# Patient Record
Sex: FEMALE | Race: BLACK OR AFRICAN AMERICAN | NOT HISPANIC OR LATINO | Employment: FULL TIME | RURAL
[De-identification: names, ages, dates, MRNs, and addresses within clinical notes are randomized per-mention and may not be internally consistent; named-entity substitution may affect disease eponyms.]

---

## 2017-12-17 ENCOUNTER — OFFICE VISIT (OUTPATIENT)
Dept: RETAIL CLINIC | Facility: CLINIC | Age: 22
End: 2017-12-17

## 2017-12-17 VITALS
BODY MASS INDEX: 35.79 KG/M2 | WEIGHT: 228 LBS | OXYGEN SATURATION: 99 % | RESPIRATION RATE: 20 BRPM | TEMPERATURE: 97.9 F | HEIGHT: 67 IN | HEART RATE: 89 BPM

## 2017-12-17 DIAGNOSIS — J01.41 ACUTE RECURRENT PANSINUSITIS: Primary | ICD-10-CM

## 2017-12-17 PROCEDURE — 99203 OFFICE O/P NEW LOW 30 MIN: CPT | Performed by: NURSE PRACTITIONER

## 2017-12-17 RX ORDER — FLUOXETINE 10 MG/1
10 CAPSULE ORAL DAILY
COMMUNITY
End: 2022-12-12 | Stop reason: SDUPTHER

## 2017-12-17 RX ORDER — PREDNISONE 10 MG/1
TABLET ORAL DAILY
Qty: 21 EACH | Refills: 0 | Status: SHIPPED | OUTPATIENT
Start: 2017-12-17 | End: 2017-12-23

## 2017-12-17 RX ORDER — AMOXICILLIN AND CLAVULANATE POTASSIUM 875; 125 MG/1; MG/1
1 TABLET, FILM COATED ORAL 2 TIMES DAILY
Qty: 20 TABLET | Refills: 0 | Status: SHIPPED | OUTPATIENT
Start: 2017-12-17 | End: 2019-08-03

## 2017-12-17 RX ORDER — PSEUDOEPHEDRINE HCL 120 MG/1
120 TABLET, FILM COATED, EXTENDED RELEASE ORAL EVERY 12 HOURS
Qty: 20 TABLET | Refills: 0 | Status: SHIPPED | OUTPATIENT
Start: 2017-12-17 | End: 2017-12-27

## 2017-12-17 NOTE — PROGRESS NOTES
"Nurys Craig is a 22 y.o. female.     Sinus Problem   This is a new problem. The current episode started in the past 7 days. The problem has been gradually worsening since onset. There has been no fever. The pain is moderate. Associated symptoms include congestion, coughing (mild), headaches, a hoarse voice, sinus pressure and swollen glands. Pertinent negatives include no chills, ear pain, neck pain, shortness of breath, sneezing or sore throat. Past treatments include nothing. The treatment provided no relief.        The following portions of the patient's history were reviewed and updated as appropriate: allergies, current medications, past medical history, past social history, past surgical history and problem list.    Review of Systems   Constitutional: Negative for appetite change, chills and fever.   HENT: Positive for congestion, hoarse voice, postnasal drip, rhinorrhea, sinus pain and sinus pressure. Negative for ear pain, sneezing, sore throat and trouble swallowing.    Eyes: Negative.    Respiratory: Positive for cough (mild). Negative for chest tightness, shortness of breath and wheezing.    Cardiovascular: Negative.    Gastrointestinal: Negative for abdominal pain, diarrhea, nausea and vomiting.   Musculoskeletal: Negative.  Negative for neck pain.   Skin: Negative.    Neurological: Positive for headaches. Negative for dizziness.   Hematological: Positive for adenopathy.        Pulse 89  Temp 97.9 °F (36.6 °C)  Resp 20  Ht 170.2 cm (67\")  Wt 103 kg (228 lb)  LMP 12/03/2017  SpO2 99%  BMI 35.71 kg/m2     Objective   Physical Exam   Constitutional: She is oriented to person, place, and time. Vital signs are normal. She appears well-developed and well-nourished.   HENT:   Head: Normocephalic.   Right Ear: External ear and ear canal normal. No drainage, swelling or tenderness. Tympanic membrane is bulging. Tympanic membrane is not erythematous.   Left Ear: External ear and ear canal " normal. No drainage, swelling or tenderness. Tympanic membrane is bulging. Tympanic membrane is not erythematous.   Nose: Mucosal edema and rhinorrhea present. Right sinus exhibits maxillary sinus tenderness (severe) and frontal sinus tenderness. Left sinus exhibits maxillary sinus tenderness (severe) and frontal sinus tenderness.   Mouth/Throat: Uvula is midline, oropharynx is clear and moist and mucous membranes are normal. Tonsils are 0 on the right. Tonsils are 0 on the left. No tonsillar exudate.   Eyes: Conjunctivae are normal. Pupils are equal, round, and reactive to light.   Cardiovascular: Normal rate, regular rhythm, S1 normal, S2 normal and normal heart sounds.    Pulmonary/Chest: Effort normal and breath sounds normal. No respiratory distress. She has no wheezes.   Lymphadenopathy:        Head (right side): No tonsillar adenopathy present.        Head (left side): No tonsillar adenopathy present.     She has no cervical adenopathy.   Neurological: She is alert and oriented to person, place, and time.   Skin: Skin is warm, dry and intact. No rash noted.   Psychiatric: She has a normal mood and affect. Her speech is normal and behavior is normal. Thought content normal.   Vitals reviewed.      Assessment/Plan   Rosa was seen today for sinus problem.    Diagnoses and all orders for this visit:    Acute recurrent pansinusitis  -     PredniSONE (DELTASONE) 10 MG (21) tablet pack; Take  by mouth Daily for 6 days. Use as directed on package  -     amoxicillin-clavulanate (AUGMENTIN) 875-125 MG per tablet; Take 1 tablet by mouth 2 (Two) Times a Day.  -     pseudoephedrine (SUDAFED) 120 MG 12 hr tablet; Take 1 tablet by mouth Every 12 (Twelve) Hours for 10 days.

## 2019-08-03 ENCOUNTER — OFFICE VISIT (OUTPATIENT)
Dept: RETAIL CLINIC | Facility: CLINIC | Age: 24
End: 2019-08-03

## 2019-08-03 VITALS
HEART RATE: 87 BPM | BODY MASS INDEX: 42.13 KG/M2 | TEMPERATURE: 98 F | OXYGEN SATURATION: 99 % | DIASTOLIC BLOOD PRESSURE: 86 MMHG | WEIGHT: 269 LBS | RESPIRATION RATE: 15 BRPM | SYSTOLIC BLOOD PRESSURE: 122 MMHG

## 2019-08-03 DIAGNOSIS — J01.00 ACUTE NON-RECURRENT MAXILLARY SINUSITIS: Primary | ICD-10-CM

## 2019-08-03 PROCEDURE — 99213 OFFICE O/P EST LOW 20 MIN: CPT | Performed by: NURSE PRACTITIONER

## 2019-08-03 RX ORDER — FLUTICASONE PROPIONATE 50 MCG
2 SPRAY, SUSPENSION (ML) NASAL DAILY
COMMUNITY

## 2019-08-03 RX ORDER — AMOXICILLIN AND CLAVULANATE POTASSIUM 875; 125 MG/1; MG/1
1 TABLET, FILM COATED ORAL 2 TIMES DAILY
Qty: 14 TABLET | Refills: 0 | Status: SHIPPED | OUTPATIENT
Start: 2019-08-03 | End: 2019-08-10

## 2019-08-03 RX ORDER — BROMPHENIRAMINE MALEATE, PSEUDOEPHEDRINE HYDROCHLORIDE, AND DEXTROMETHORPHAN HYDROBROMIDE 2; 30; 10 MG/5ML; MG/5ML; MG/5ML
5 SYRUP ORAL 4 TIMES DAILY PRN
Qty: 120 ML | Refills: 0 | Status: SHIPPED | OUTPATIENT
Start: 2019-08-03 | End: 2019-08-08

## 2019-08-03 NOTE — PATIENT INSTRUCTIONS

## 2019-08-03 NOTE — PROGRESS NOTES
Subjective   Rosa Craig is a 24 y.o. female.   /86   Pulse 87   Temp 98 °F (36.7 °C)   Resp 15   Wt 122 kg (269 lb)   LMP  (Within Months)   SpO2 99%   BMI 42.13 kg/m²   Past Medical History:   Diagnosis Date   • Allergic    • Anxiety    • Asthma    • Depression      Allergies   Allergen Reactions   • Sulfur Rash       Sinusitis   This is a new problem. The current episode started in the past 7 days. The problem has been gradually worsening since onset. There has been no fever. The pain is moderate. Associated symptoms include congestion, coughing, headaches, a hoarse voice and sinus pressure. Pertinent negatives include no chills, diaphoresis, ear pain, neck pain, shortness of breath, sneezing, sore throat or swollen glands.        The following portions of the patient's history were reviewed and updated as appropriate: allergies, current medications, past family history, past medical history, past social history, past surgical history and problem list.    Review of Systems   Constitutional: Negative for chills and diaphoresis.   HENT: Positive for congestion, hoarse voice and sinus pressure. Negative for ear pain, sneezing and sore throat.    Respiratory: Positive for cough. Negative for shortness of breath.    Musculoskeletal: Negative for neck pain.   Neurological: Positive for headaches.       Objective   Physical Exam   Constitutional: She appears well-developed and well-nourished.  Non-toxic appearance. She appears ill (mild).   HENT:   Head: Normocephalic and atraumatic.   Right Ear: Tympanic membrane and ear canal normal.   Left Ear: Tympanic membrane and ear canal normal.   Nose: Right sinus exhibits maxillary sinus tenderness. Right sinus exhibits no frontal sinus tenderness. Left sinus exhibits maxillary sinus tenderness. Left sinus exhibits no frontal sinus tenderness.   Mouth/Throat: Uvula is midline, oropharynx is clear and moist and mucous membranes are normal.   Cardiovascular:  Regular rhythm and normal heart sounds.   Pulmonary/Chest: Effort normal. She has no wheezes. She has no rhonchi. She has no rales.   Lymphadenopathy:     She has no cervical adenopathy.   Skin: Skin is warm and dry.       Assessment/Plan   Rosa was seen today for sinusitis.    Diagnoses and all orders for this visit:    Acute non-recurrent maxillary sinusitis    Other orders  -     amoxicillin-clavulanate (AUGMENTIN) 875-125 MG per tablet; Take 1 tablet by mouth 2 (Two) Times a Day for 7 days.  -     brompheniramine-pseudoephedrine-DM 30-2-10 MG/5ML syrup; Take 5 mL by mouth 4 (Four) Times a Day As Needed for Congestion or Cough for up to 5 days.

## 2020-02-21 ENCOUNTER — LAB REQUISITION (OUTPATIENT)
Dept: LAB | Facility: HOSPITAL | Age: 25
End: 2020-02-21

## 2020-02-21 DIAGNOSIS — Z00.00 ROUTINE GENERAL MEDICAL EXAMINATION AT A HEALTH CARE FACILITY: ICD-10-CM

## 2020-02-21 PROCEDURE — 36415 COLL VENOUS BLD VENIPUNCTURE: CPT | Performed by: NURSE PRACTITIONER

## 2021-06-02 RX ORDER — NORETHINDRONE ACETATE AND ETHINYL ESTRADIOL AND FERROUS FUMARATE 1MG-20(21)
KIT ORAL
Qty: 28 TABLET | Refills: 3 | Status: SHIPPED | OUTPATIENT
Start: 2021-06-02 | End: 2022-12-12 | Stop reason: SDUPTHER

## 2022-06-06 ENCOUNTER — DOCUMENTATION (OUTPATIENT)
Dept: OBSTETRICS AND GYNECOLOGY | Facility: CLINIC | Age: 27
End: 2022-06-06

## 2022-06-06 RX ORDER — NORETHINDRONE ACETATE AND ETHINYL ESTRADIOL AND FERROUS FUMARATE 1MG-20(21)
KIT ORAL
Qty: 28 TABLET | Refills: 3 | OUTPATIENT
Start: 2022-06-06

## 2022-12-08 PROBLEM — E55.9 VITAMIN D DEFICIENCY: Status: ACTIVE | Noted: 2022-12-08

## 2022-12-08 PROBLEM — J45.909 ASTHMA: Status: ACTIVE | Noted: 2022-12-08

## 2022-12-08 PROBLEM — R73.03 PREDIABETES: Status: ACTIVE | Noted: 2022-12-08

## 2022-12-08 PROBLEM — J30.1 ALLERGIC RHINITIS DUE TO POLLEN: Status: ACTIVE | Noted: 2022-12-08

## 2022-12-08 PROBLEM — E66.9 OBESITY: Status: ACTIVE | Noted: 2022-12-08

## 2022-12-08 PROBLEM — E78.2 MIXED HYPERLIPIDEMIA: Status: ACTIVE | Noted: 2022-12-08

## 2022-12-08 PROBLEM — F32.A DEPRESSION: Status: ACTIVE | Noted: 2022-12-08

## 2022-12-08 PROBLEM — F41.9 ANXIETY: Status: ACTIVE | Noted: 2022-12-08

## 2022-12-08 PROBLEM — E28.2 POLYCYSTIC OVARIAN SYNDROME: Status: ACTIVE | Noted: 2022-12-08

## 2022-12-09 ENCOUNTER — OFFICE VISIT (OUTPATIENT)
Dept: FAMILY MEDICINE CLINIC | Facility: CLINIC | Age: 27
End: 2022-12-09

## 2022-12-09 VITALS
OXYGEN SATURATION: 99 % | SYSTOLIC BLOOD PRESSURE: 124 MMHG | HEIGHT: 67 IN | TEMPERATURE: 97.3 F | BODY MASS INDEX: 43.41 KG/M2 | DIASTOLIC BLOOD PRESSURE: 84 MMHG | WEIGHT: 276.6 LBS | HEART RATE: 81 BPM

## 2022-12-09 DIAGNOSIS — I10 BENIGN HYPERTENSION: ICD-10-CM

## 2022-12-09 DIAGNOSIS — E66.01 CLASS 3 SEVERE OBESITY DUE TO EXCESS CALORIES WITHOUT SERIOUS COMORBIDITY WITH BODY MASS INDEX (BMI) OF 40.0 TO 44.9 IN ADULT: ICD-10-CM

## 2022-12-09 DIAGNOSIS — Z23 NEED FOR HPV VACCINATION: ICD-10-CM

## 2022-12-09 DIAGNOSIS — F41.9 ANXIETY: ICD-10-CM

## 2022-12-09 DIAGNOSIS — E78.2 MIXED HYPERLIPIDEMIA: ICD-10-CM

## 2022-12-09 DIAGNOSIS — E55.9 VITAMIN D DEFICIENCY DISEASE: ICD-10-CM

## 2022-12-09 DIAGNOSIS — Z23 NEED FOR TDAP VACCINATION: ICD-10-CM

## 2022-12-09 DIAGNOSIS — R73.03 PREDIABETES: ICD-10-CM

## 2022-12-09 DIAGNOSIS — Z11.59 NEED FOR HEPATITIS C SCREENING TEST: ICD-10-CM

## 2022-12-09 DIAGNOSIS — Z11.4 SCREENING FOR HIV (HUMAN IMMUNODEFICIENCY VIRUS): ICD-10-CM

## 2022-12-09 DIAGNOSIS — Z00.00 ROUTINE GENERAL MEDICAL EXAMINATION AT A HEALTH CARE FACILITY: Primary | ICD-10-CM

## 2022-12-09 DIAGNOSIS — H91.93 HEARING DEFICIT, BILATERAL: ICD-10-CM

## 2022-12-09 DIAGNOSIS — Z23 NEED FOR PROPHYLACTIC VACCINATION AND INOCULATION AGAINST INFLUENZA: ICD-10-CM

## 2022-12-09 DIAGNOSIS — F32.A DEPRESSION, UNSPECIFIED DEPRESSION TYPE: ICD-10-CM

## 2022-12-09 DIAGNOSIS — Z23 NEED FOR PROPHYLACTIC VACCINATION AGAINST STREPTOCOCCUS PNEUMONIAE (PNEUMOCOCCUS): ICD-10-CM

## 2022-12-09 PROCEDURE — 99395 PREV VISIT EST AGE 18-39: CPT | Performed by: INTERNAL MEDICINE

## 2022-12-09 PROCEDURE — 93000 ELECTROCARDIOGRAM COMPLETE: CPT | Performed by: INTERNAL MEDICINE

## 2022-12-09 PROCEDURE — 90686 IIV4 VACC NO PRSV 0.5 ML IM: CPT | Performed by: INTERNAL MEDICINE

## 2022-12-09 PROCEDURE — 90677 PCV20 VACCINE IM: CPT | Performed by: INTERNAL MEDICINE

## 2022-12-09 PROCEDURE — 2014F MENTAL STATUS ASSESS: CPT | Performed by: INTERNAL MEDICINE

## 2022-12-09 PROCEDURE — 90471 IMMUNIZATION ADMIN: CPT | Performed by: INTERNAL MEDICINE

## 2022-12-09 PROCEDURE — 90472 IMMUNIZATION ADMIN EACH ADD: CPT | Performed by: INTERNAL MEDICINE

## 2022-12-09 PROCEDURE — 3008F BODY MASS INDEX DOCD: CPT | Performed by: INTERNAL MEDICINE

## 2022-12-09 PROCEDURE — 90715 TDAP VACCINE 7 YRS/> IM: CPT | Performed by: INTERNAL MEDICINE

## 2022-12-09 PROCEDURE — 90651 9VHPV VACCINE 2/3 DOSE IM: CPT | Performed by: INTERNAL MEDICINE

## 2022-12-09 PROCEDURE — 36415 COLL VENOUS BLD VENIPUNCTURE: CPT | Performed by: INTERNAL MEDICINE

## 2022-12-09 RX ORDER — ATORVASTATIN CALCIUM 20 MG/1
20 TABLET, FILM COATED ORAL
COMMUNITY
Start: 2022-11-26 | End: 2022-12-12 | Stop reason: SDUPTHER

## 2022-12-09 RX ORDER — ERGOCALCIFEROL 1.25 MG/1
CAPSULE ORAL
COMMUNITY
Start: 2022-09-26 | End: 2022-12-12 | Stop reason: SDUPTHER

## 2022-12-09 RX ORDER — LORATADINE 10 MG/1
TABLET ORAL
COMMUNITY
Start: 2022-09-02 | End: 2022-12-12 | Stop reason: SDUPTHER

## 2022-12-09 RX ORDER — LISINOPRIL 5 MG/1
TABLET ORAL
COMMUNITY
Start: 2022-09-20 | End: 2022-12-12 | Stop reason: SDUPTHER

## 2022-12-09 NOTE — PROGRESS NOTES
Female Physical Note      Date: 2022   Patient Name: Rosa Craig  : 1995   MRN: [id number]     Chief Complaint:    Chief Complaint   Patient presents with   • Annual Exam       History of Present Illness: Rosa Craig is a 27 y.o. female who is here today for their annual health maintenance and physical.  Patient has no specific concerns or complaints at this time.  History of prediabetes rarely checking blood sugars but taking metformin, quite sedentary, diet fairly healthy, history of hypertension with systolics in the 120s diastolics not recall, no chest pains palpitations dyspnea or edema.  No neuropathic symptoms in her feet.  She does have significant obesity having gained 3 pounds since she was last seen here 6 months ago, admitting that she is sedentary in her life.  Plans to increase her activity level.  Also history of vitamin D deficiency on high-dose weekly vitamin D supplementation.  History of anxiety and depression currently on fluoxetine and bupropion doing well from the standpoint.  He does require some labs updated and also require some vaccine update.  She does have a history of PCOS having been seen by Dr. Olsen with a Pap smear in 2020, normal study, menses regular on oral contraceptives.  Not sexually active for a number of years now      Subjective      Review of Systems:   Review of Systems    Past Medical History, Social History, Family History and Care Team were all reviewed with patient and updated as appropriate.     Medications:     Current Outpatient Medications:   •  atorvastatin (LIPITOR) 20 MG tablet, Take 20 mg by mouth every night at bedtime., Disp: , Rfl:   •  Aurovela FE  1-20 MG-MCG per tablet, TAKE 1 TABLET BY MOUTH EVERY DAY, Disp: 28 tablet, Rfl: 3  •  BUSPIRONE HCL PO, Take  by mouth., Disp: , Rfl:   •  FLUoxetine (PROzac) 10 MG capsule, Take 10 mg by mouth Daily., Disp: , Rfl:   •  fluticasone (FLONASE) 50 MCG/ACT nasal spray, 2 sprays into the  nostril(s) as directed by provider Daily., Disp: , Rfl:   •  lisinopril (PRINIVIL,ZESTRIL) 5 MG tablet, TAKE 1 TABLET BY MOUTH AT BEDTIME FOR URINE PROTEIN, Disp: , Rfl:   •  loratadine (CLARITIN) 10 MG tablet, TAKE 1 TABLET BY MOUTH EVERY DAY FOR ALLERGIES, Disp: , Rfl:   •  metFORMIN (GLUCOPHAGE) 500 MG tablet, TAKE 1 TABLET BY MOUTH TWICE A DAY WITH MORNING AND EVENING MEALS, Disp: 180 tablet, Rfl: 1  •  vitamin D (ERGOCALCIFEROL) 1.25 MG (53783 UT) capsule capsule, TAKE 1 CAPSULE BY MOUTH EVERY WEEK FOR 13 WEEKS, Disp: , Rfl:     Allergies:   Allergies   Allergen Reactions   • Elemental Sulfur Rash   • Sulfa Antibiotics Rash       Immunizations:  Health Maintenance Summary          Ordered - HEPATITIS C SCREENING (Once) Ordered on 12/9/2022    No completion, postpone, or frequency change history exists for this topic.          Overdue - COVID-19 Vaccine (4 - Booster for Moderna series) Overdue since 6/7/2022 04/12/2022  Imm Admin: COVID-19 (MODERNA) 1st, 2nd, 3rd Dose Only    05/22/2021  Imm Admin: COVID-19 (MODERNA) 1st, 2nd, 3rd Dose Only    04/17/2021  Imm Admin: COVID-19 (MODERNA) 1st, 2nd, 3rd Dose Only          Overdue - ANNUAL PHYSICAL (Yearly) Overdue since 12/4/2022 12/03/2021  Done          PAP SMEAR (Every 3 Years) Next due on 2/1/2023 02/01/2020  Patient-Reported (Performed Externally) - Normal reported by patient, Dr. Olsen          LIPID PANEL (Yearly) Next due on 6/28/2023 06/28/2022  Done          TDAP/TD VACCINES (2 - Td or Tdap) Next due on 12/9/2032 12/09/2022  Imm Admin: Tdap          Pneumococcal Vaccine 0-64 (Series Information) Completed    12/09/2022  Imm Admin: Pneumococcal Conjugate 20-Valent (PCV20)          INFLUENZA VACCINE  Completed    12/09/2022  Imm Admin: FluLaval/Fluzone >6mos    12/03/2021  Imm Admin: Influenza, Unspecified          HPV VACCINES (Series Information) Completed    12/09/2022  Imm Admin: Hpv9    09/14/2007  Imm Admin: HPV Quadrivalent                  Orders Placed This Encounter   Procedures   • Pneumococcal Conjugate Vaccine 20-Valent All   • Tdap Vaccine Greater Than or Equal To 6yo IM   • HPV Vaccine   • FluLaval/Fluarix/Fluzone >6 Months        Colorectal Screening:   NA   Last Completed Colonoscopy     This patient has no relevant Health Maintenance data.        Pap:  2020, normal   Last Completed Pap Smear          PAP SMEAR (Every 3 Years) Next due on 2/1/2023 02/01/2020  Patient-Reported (Performed Externally) - Normal reported by patient, Dr. Olsen               Mammogram:  NA   Last Completed Mammogram     This patient has no relevant Health Maintenance data.           CT for Smoker (Age 50-80, 20 pk yr):   NA  Bone Density/DEXA (Age 65 or high risk): NA  Hep C (Age 18-79 once): pending  HIV (Age 15-65 once): No results found for: [id number]  A1c: No results found for: HGBA1C pending  Lipid panel:  No results found for: LIPIDEXCLUSI current 6/2022    The ASCVD Risk score (Beni COOK, et al., 2019) failed to calculate for the following reasons:    The 2019 ASCVD risk score is only valid for ages 40 to 79      Tobacco Use: Low Risk    • Smoking Tobacco Use: Never   • Smokeless Tobacco Use: Never   • Passive Exposure: Not on file       Social History     Substance and Sexual Activity   Alcohol Use Never        Social History     Substance and Sexual Activity   Drug Use Never        Diet/Physical activity: Sedentary, fair diet    Sexual Health: Not requiring contraception though she does take oral contraceptives for menstrual regulation, not attempting pregnancy   Menopause: N/A  Menstrual Cycles: Regular, last menstrual cycle: Within the last month    Depression: PHQ-2 Depression Screening  PHQ-9 Total Score: 0     Objective     Physical Exam:  Vital Signs:   Vitals:    12/09/22 0920   BP: 124/84   BP Location: Left arm   Patient Position: Sitting   Cuff Size: Adult   Pulse: 81   Temp: 97.3 °F (36.3 °C)   TempSrc: Temporal   SpO2: 99%   Weight:  "125 kg (276 lb 9.6 oz)   Height: 170.2 cm (67\")     Body mass index is 43.32 kg/m².     Physical Exam  Vitals and nursing note reviewed.   Constitutional:       Appearance: Normal appearance. She is obese.      Comments: Taller statured, morbidly obese with BMI 43.3, chronically hard of hearing with hearing aids   HENT:      Head: Normocephalic and atraumatic.      Right Ear: Tympanic membrane, ear canal and external ear normal.      Left Ear: Tympanic membrane, ear canal and external ear normal.      Ears:      Comments: Hearing impediment, bilateral hearing aids in place which were removed for the exam     Nose: Nose normal.      Mouth/Throat:      Mouth: Mucous membranes are moist.      Pharynx: Oropharynx is clear.   Eyes:      Extraocular Movements: Extraocular movements intact.      Conjunctiva/sclera: Conjunctivae normal.      Pupils: Pupils are equal, round, and reactive to light.   Neck:      Vascular: No carotid bruit.      Comments: No masses or adenopathy  Cardiovascular:      Rate and Rhythm: Normal rate and regular rhythm.      Pulses: Normal pulses.      Heart sounds: Normal heart sounds. No murmur heard.    No friction rub. No gallop.      Comments: 2+ carotids without bruits, 2+ radial pulses, 2+ femoral pulses without bruits, 2+ bipedal pulses with good perfusion and no edema  Pulmonary:      Effort: Pulmonary effort is normal.      Breath sounds: Normal breath sounds.      Comments: No cough wheeze or dyspnea  Chest:   Breasts:     Joseph Score is 5.      Breasts are symmetrical.      Right: Normal. No swelling, mass, nipple discharge, skin change or tenderness.      Left: Normal. No swelling, mass, nipple discharge, skin change or tenderness.   Abdominal:      Comments: Abdomen is obese soft and nontender with no organomegaly or masses and normal bowel sound   Genitourinary:     Comments: Pelvic exam deferred given Pap smear less than 3 years ago normal, with no complaints, rectal exam deferred " given lack of complaints and no indication for exam  Musculoskeletal:         General: No swelling, tenderness or deformity. Normal range of motion.      Cervical back: Normal range of motion and neck supple. No rigidity or tenderness.      Right lower leg: No edema.      Left lower leg: No edema.   Lymphadenopathy:      Cervical: No cervical adenopathy.      Upper Body:      Right upper body: No supraclavicular or axillary adenopathy.      Left upper body: No supraclavicular or axillary adenopathy.   Skin:     General: Skin is warm and dry.      Capillary Refill: Capillary refill takes less than 2 seconds.      Findings: No lesion or rash.   Neurological:      General: No focal deficit present.      Mental Status: She is alert and oriented to person, place, and time. Mental status is at baseline.      Cranial Nerves: No cranial nerve deficit.      Sensory: No sensory deficit.      Motor: No weakness.      Coordination: Coordination normal.      Comments: Bipedal exam with 2+ DP and 2+ PT pulses bilaterally, normal sensation in both feet to fine touch vibration and pinprick, motor exam normal, no edema, does have bilateral hearing deficit wearing hearing aids chronically   Psychiatric:         Mood and Affect: Mood normal.         Behavior: Behavior normal.         Thought Content: Thought content normal.         Judgment: Judgment normal.         POCT Results (if applicable);   No results found for this or any previous visit.       ECG 12 Lead    Date/Time: 12/9/2022 10:33 AM  Performed by: Danny Boss MD  Authorized by: Danny Boss MD   Comparison: compared with previous ECG from 12/3/2021  Similar to previous ECG  Comparison to previous ECG: Normal sinus rhythm rate 70, RR prime in V1, nonspecific T wave abnormality in V1 and V3 unchanged versus prior study, likely of no clinical significance              Assessment / Plan      Assessment/Plan:   Diagnoses and all orders for this visit:    1. Routine  general medical examination at a health care facility (Primary)    2. Prediabetes  -     Basic Metabolic Panel; Future  -     Hemoglobin A1c; Future  -     TSH Rfx On Abnormal To Free T4; Future  -     MicroAlbumin, Urine, Random - Urine, Clean Catch; Future  -     MicroAlbumin, Urine, Random - Urine, Clean Catch  -     TSH Rfx On Abnormal To Free T4  -     Hemoglobin A1c  -     Basic Metabolic Panel  No secondary sequelae most recent hemoglobin A1c 6.2% in 6/2022, on metformin 500 mg twice daily.  Emphasize healthy lifestyle diet and exercise, updating testing.  3. Benign hypertension  -     Urinalysis With Culture If Indicated -; Future  -     ECG 12 Lead  -     Urinalysis With Culture If Indicated - Urine, Clean Catch  Good control acutely and chronically take low-dose lisinopril 5 mg daily.  Continue monitoring  4. Mixed hyperlipidemia  On atorvastatin 20 mg nightly.  Update testing.  5. Anxiety  Overall controlled well taking fluoxetine 10 mg daily and buspirone.  Continue same.  6. Depression, unspecified depression type  See above.  7. Vitamin D deficiency disease  -     Vitamin D,25-Hydroxy; Future  -     Vitamin D,25-Hydroxy  On high-dose vitamin D 50,000 IU weekly.  Update level.  8. Class 3 severe obesity due to excess calories without serious comorbidity with body mass index (BMI) of 40.0 to 44.9 in adult (HCC)  -     TSH Rfx On Abnormal To Free T4; Future  -     TSH Rfx On Abnormal To Free T4  Progression of weight gain, significantly overweight.  Emphasized to patient need for strict lifestyle change including regular daily exercise of at least 30 minutes, and dietary modification in order to lose weight.  Discussed the health risks of her obesity  9. Hearing deficit, bilateral  Longstanding since birth, wearing bilateral hearing aids.  10. Screening for HIV (human immunodeficiency virus)  -     HIV-1 / O / 2 Ag / Antibody 4th Generation; Future  -     HIV-1 / O / 2 Ag / Antibody 4th Generation    11.  Need for hepatitis C screening test  -     Hepatitis C Antibody; Future  -     Hepatitis C Antibody    12. Need for HPV vaccination  -     HPV Vaccine    13. Need for prophylactic vaccination against Streptococcus pneumoniae (pneumococcus)  -     Pneumococcal Conjugate Vaccine 20-Valent All    14. Need for Tdap vaccination  -     Tdap Vaccine Greater Than or Equal To 6yo IM    15. Need for prophylactic vaccination and inoculation against influenza  -     Cancel: Fluzone Split Quad (Multi-dose)  -     FluLaval/Fluarix/Fluzone >6 Months    Last Pap smear spring 2020 with Dr. Olsen, not sexually active for a number of years now.  Consider updating Pap smear next year.    Healthcare Maintenance:  Counseling provided based on age appropriate USPSTF guidelines.  Class 3 Severe Obesity (BMI >=40). Obesity-related health conditions include the following: hypertension and Prediabetes. Obesity is worsening. BMI is is above average; BMI management plan is completed. We discussed low calorie, low carb based diet program, portion control and increasing exercise.    Rosa Kiara voices understanding and acceptance of this advice and will call back with any further questions or concerns. AVS with preventive healthcare tips printed for patient.     Vaccine Counseling:  “Discussed risks/benefits to vaccination, reviewed components of the vaccine, discussed VIS, discussed informed consent, informed consent obtained. Patient/Parent was allowed to accept or refuse vaccine. Questions answered to satisfactory state of patient/Parent. We reviewed typical age appropriate and seasonally appropriate vaccinations. Reviewed immunization history and updated state vaccination form as needed. Patient was counseled on HPV  Influenza  Prevnar 20  Tdap      Follow Up:   Return in about 6 months (around 6/9/2023) for Recheck, Labs today.      At [facility], we believe that sharing information builds trust and better relationships. You are  receiving this note because you recently visited [facility]. It is possible you will see health information before a provider has talked with you about it. This kind of information can be easy to misunderstand. To help you fully understand what it means for your health, we urge you to discuss this note with your provider.    Danny Boss MD  [facility]

## 2022-12-09 NOTE — PROGRESS NOTES
Venipuncture Blood Specimen Collection  Venipuncture performed in left hand by Neisha Laguna MA with good hemostasis. Patient tolerated the procedure well without complications.   12/09/22   Neisha Laguna MA

## 2022-12-10 LAB
25(OH)D3+25(OH)D2 SERPL-MCNC: 24 NG/ML (ref 30–100)
APPEARANCE UR: CLEAR
BACTERIA #/AREA URNS HPF: NORMAL /[HPF]
BILIRUB UR QL STRIP: NEGATIVE
BUN SERPL-MCNC: 5 MG/DL (ref 6–20)
BUN/CREAT SERPL: 8 (ref 9–23)
CALCIUM SERPL-MCNC: 9.2 MG/DL (ref 8.7–10.2)
CASTS URNS QL MICRO: NORMAL /LPF
CHLORIDE SERPL-SCNC: 100 MMOL/L (ref 96–106)
CO2 SERPL-SCNC: 21 MMOL/L (ref 20–29)
COLOR UR: YELLOW
CREAT SERPL-MCNC: 0.63 MG/DL (ref 0.57–1)
EGFRCR SERPLBLD CKD-EPI 2021: 125 ML/MIN/1.73
EPI CELLS #/AREA URNS HPF: NORMAL /HPF (ref 0–10)
GLUCOSE SERPL-MCNC: 86 MG/DL (ref 70–99)
GLUCOSE UR QL STRIP: NEGATIVE
HBA1C MFR BLD: 6 % (ref 4.8–5.6)
HCV AB S/CO SERPL IA: <0.1 S/CO RATIO (ref 0–0.9)
HGB UR QL STRIP: NEGATIVE
HIV 1+2 AB+HIV1 P24 AG SERPL QL IA: NON REACTIVE
KETONES UR QL STRIP: NEGATIVE
LEUKOCYTE ESTERASE UR QL STRIP: NEGATIVE
MICRO URNS: NORMAL
MICRO URNS: NORMAL
MICROALBUMIN UR-MCNC: 7.9 UG/ML
NITRITE UR QL STRIP: NEGATIVE
PH UR STRIP: 6.5 [PH] (ref 5–7.5)
POTASSIUM SERPL-SCNC: 3.9 MMOL/L (ref 3.5–5.2)
PROT UR QL STRIP: NEGATIVE
RBC #/AREA URNS HPF: NORMAL /HPF (ref 0–2)
SODIUM SERPL-SCNC: 139 MMOL/L (ref 134–144)
SP GR UR STRIP: 1.01 (ref 1–1.03)
TSH SERPL DL<=0.005 MIU/L-ACNC: 1.23 UIU/ML (ref 0.45–4.5)
URINALYSIS REFLEX: NORMAL
UROBILINOGEN UR STRIP-MCNC: 0.2 MG/DL (ref 0.2–1)
WBC #/AREA URNS HPF: NORMAL /HPF (ref 0–5)

## 2022-12-12 ENCOUNTER — TELEPHONE (OUTPATIENT)
Dept: FAMILY MEDICINE CLINIC | Facility: CLINIC | Age: 27
End: 2022-12-12

## 2022-12-12 RX ORDER — LISINOPRIL 5 MG/1
5 TABLET ORAL DAILY
Qty: 90 TABLET | Refills: 1 | Status: SHIPPED | OUTPATIENT
Start: 2022-12-12 | End: 2022-12-12 | Stop reason: SDUPTHER

## 2022-12-12 RX ORDER — LISINOPRIL 5 MG/1
5 TABLET ORAL DAILY
Qty: 90 TABLET | Refills: 1 | Status: SHIPPED | OUTPATIENT
Start: 2022-12-12

## 2022-12-12 RX ORDER — NORETHINDRONE ACETATE AND ETHINYL ESTRADIOL 1MG-20(21)
1 KIT ORAL DAILY
Qty: 28 TABLET | Refills: 3 | Status: SHIPPED | OUTPATIENT
Start: 2022-12-12

## 2022-12-12 RX ORDER — FLUOXETINE 10 MG/1
10 CAPSULE ORAL DAILY
Qty: 90 CAPSULE | Refills: 1 | Status: SHIPPED | OUTPATIENT
Start: 2022-12-12 | End: 2022-12-12 | Stop reason: SDUPTHER

## 2022-12-12 RX ORDER — FLUOXETINE 10 MG/1
10 CAPSULE ORAL DAILY
Qty: 90 CAPSULE | Refills: 1 | Status: SHIPPED | OUTPATIENT
Start: 2022-12-12

## 2022-12-12 RX ORDER — NORETHINDRONE ACETATE AND ETHINYL ESTRADIOL 1MG-20(21)
1 KIT ORAL DAILY
Qty: 28 TABLET | Refills: 3 | Status: SHIPPED | OUTPATIENT
Start: 2022-12-12 | End: 2022-12-12 | Stop reason: SDUPTHER

## 2022-12-12 RX ORDER — ERGOCALCIFEROL 1.25 MG/1
50000 CAPSULE ORAL
Qty: 5 CAPSULE | Refills: 3 | Status: SHIPPED | OUTPATIENT
Start: 2022-12-12

## 2022-12-12 RX ORDER — LORATADINE 10 MG/1
10 TABLET ORAL DAILY
Qty: 90 TABLET | Refills: 1 | Status: SHIPPED | OUTPATIENT
Start: 2022-12-12

## 2022-12-12 RX ORDER — ATORVASTATIN CALCIUM 20 MG/1
20 TABLET, FILM COATED ORAL
Qty: 90 TABLET | Refills: 1 | Status: SHIPPED | OUTPATIENT
Start: 2022-12-12

## 2022-12-12 RX ORDER — ATORVASTATIN CALCIUM 20 MG/1
20 TABLET, FILM COATED ORAL
Qty: 90 TABLET | Refills: 1 | Status: SHIPPED | OUTPATIENT
Start: 2022-12-12 | End: 2022-12-12 | Stop reason: SDUPTHER

## 2022-12-12 RX ORDER — LORATADINE 10 MG/1
10 TABLET ORAL DAILY
Qty: 90 TABLET | Refills: 1 | Status: SHIPPED | OUTPATIENT
Start: 2022-12-12 | End: 2022-12-12 | Stop reason: SDUPTHER

## 2022-12-12 NOTE — TELEPHONE ENCOUNTER
----- Message from Danny Boss MD sent at 12/11/2022  1:20 PM EST -----  Please advise patient that her recently obtained laboratory testing was satisfactory including thyroid testing, chemistry profile, cholesterol profile, and urinalysis along with her HIV and hepatitis C testing being negative.  Her hemoglobin A1c was 6.0% which was improved from her previous value of 6.2%, consistent with her known diagnosis of prediabetes, slightly improved, planning to repeat hemoglobin A1c in 6 months.  Emphasize healthy lifestyle with diet exercise and attempts at weight loss.  Her vitamin D level was slightly low at 24 with a normal range ideally of 30 or greater, patient recommended to continue her prescription high-dose vitamin D 50,000 IU taken once weekly, and would also add an additional vitamin D3 1000 IU daily over-the-counter, planning to repeat testing of all of her standard labs in another year.

## 2023-02-10 ENCOUNTER — OFFICE VISIT (OUTPATIENT)
Dept: FAMILY MEDICINE CLINIC | Facility: CLINIC | Age: 28
End: 2023-02-10
Payer: MEDICARE

## 2023-02-10 VITALS
OXYGEN SATURATION: 98 % | DIASTOLIC BLOOD PRESSURE: 82 MMHG | SYSTOLIC BLOOD PRESSURE: 122 MMHG | WEIGHT: 277.2 LBS | HEART RATE: 84 BPM | BODY MASS INDEX: 43.51 KG/M2 | TEMPERATURE: 98.1 F | HEIGHT: 67 IN

## 2023-02-10 DIAGNOSIS — B34.9 VIRAL SYNDROME: Primary | ICD-10-CM

## 2023-02-10 LAB
EXPIRATION DATE: NORMAL
FLUAV AG UPPER RESP QL IA.RAPID: NOT DETECTED
FLUBV AG UPPER RESP QL IA.RAPID: NOT DETECTED
INTERNAL CONTROL: NORMAL
Lab: NORMAL
SARS-COV-2 AG UPPER RESP QL IA.RAPID: NOT DETECTED

## 2023-02-10 PROCEDURE — 87428 SARSCOV & INF VIR A&B AG IA: CPT | Performed by: INTERNAL MEDICINE

## 2023-02-10 PROCEDURE — 99213 OFFICE O/P EST LOW 20 MIN: CPT | Performed by: INTERNAL MEDICINE

## 2023-02-10 RX ORDER — BROMPHENIRAMINE MALEATE, PSEUDOEPHEDRINE HYDROCHLORIDE, AND DEXTROMETHORPHAN HYDROBROMIDE 2; 30; 10 MG/5ML; MG/5ML; MG/5ML
SYRUP ORAL
Qty: 180 ML | Refills: 0 | Status: SHIPPED | OUTPATIENT
Start: 2023-02-10 | End: 2023-03-16

## 2023-02-10 NOTE — PROGRESS NOTES
Answers for HPI/ROS submitted by the patient on 2/10/2023  Please describe your symptoms.: Sinus pressure, sinus headaches,  thoat hurt when swallowing & joint aches expectly around the neck and shoulders  Have you had these symptoms before?: Yes  How long have you been having these symptoms?: 1-4 days  What is the primary reason for your visit?: Other        Follow Up Office Visit      Date: 02/10/2023   Patient Name: Rosa Craig  : 1995   MRN: [id number]     Chief Complaint:    Chief Complaint   Patient presents with   • Sinusitis     Pressure behind ear     • Generalized Body Aches   • Shortness of Breath       History of Present Illness: Rosa Craig is a 27 y.o. female who is here today for onset 4 days ago of some nasal congestion with some drainage, symptoms slightly improving, headache intermittently, scratchy sore throat, minimal cough primarily manifested by postnasal drainage without any deep chest congestion and no dyspnea, having some malaise with some myalgias across her neck and back, subjective fevers but no chills, 4 days ago having a couple episodes of diarrhea but this resolving with no abdominal pain nausea or vomiting, appetite slightly diminished.  Sent home from work today after she had stayed home from work for the previous couple days.  Have been vague illnesses in colleagues at her work..  Non-smoker.  Has not had COVID-19 infection in the last several months.    Subjective      Review of Systems:   Review of Systems    I have reviewed the patients family history, social history, past medical history, past surgical history and have updated it as appropriate.     Medications:     Current Outpatient Medications:   •  atorvastatin (LIPITOR) 20 MG tablet, Take 1 tablet by mouth every night at bedtime., Disp: 90 tablet, Rfl: 1  •  BUSPIRONE HCL PO, Take  by mouth., Disp: , Rfl:   •  FLUoxetine (PROzac) 10 MG capsule, Take 1 capsule by mouth Daily., Disp: 90 capsule, Rfl: 1  •   "fluticasone (FLONASE) 50 MCG/ACT nasal spray, 2 sprays into the nostril(s) as directed by provider Daily., Disp: , Rfl:   •  lisinopril (PRINIVIL,ZESTRIL) 5 MG tablet, Take 1 tablet by mouth Daily., Disp: 90 tablet, Rfl: 1  •  loratadine (CLARITIN) 10 MG tablet, Take 1 tablet by mouth Daily., Disp: 90 tablet, Rfl: 1  •  metFORMIN (GLUCOPHAGE) 500 MG tablet, Take 1 tablet by mouth 2 (Two) Times a Day With Meals., Disp: 180 tablet, Rfl: 1  •  norethindrone-ethinyl estradiol FE (Aurovela FE 1/20) 1-20 MG-MCG per tablet, Take 1 tablet by mouth Daily., Disp: 28 tablet, Rfl: 3  •  vitamin D (ERGOCALCIFEROL) 1.25 MG (44205 UT) capsule capsule, Take 1 capsule by mouth Every 7 (Seven) Days., Disp: 5 capsule, Rfl: 3  •  brompheniramine-pseudoephedrine-DM 30-2-10 MG/5ML syrup, 5 to 10 mL orally every 4-6 hours as needed for cold and cough symptoms, Disp: 180 mL, Rfl: 0    Allergies:   Allergies   Allergen Reactions   • Elemental Sulfur Rash   • Sulfa Antibiotics Rash       Objective     Physical Exam: Please see above  Vital Signs:   Vitals:    02/10/23 1150   BP: 122/82   Pulse: 84   Temp: 98.1 °F (36.7 °C)   TempSrc: Temporal   SpO2: 98%   Weight: 126 kg (277 lb 3.2 oz)   Height: 170.2 cm (67\")     Body mass index is 43.42 kg/m².       Physical Exam  General: Tall mildly ill nontoxic 27-year-old female in no acute distress.  Head and neck: TMs and canals bilaterally clear after removal of hearing aids, nares mild to moderate congestion with red turbinates and scant mucus, vague sinus discomfort to palpation diffusely across her frontal maxillary sinuses, oropharynx with mild posterior erythema, no exudate or petechiae, moist membranes, neck with some mild generalized discomfort to palpation diffusely with no focality, no adenopathy or masses, no meningeal signs  Lungs are clear with no wheeze tachypnea dyspnea or cough  Cardiac regular rate rhythm with no murmurs gallops or rubs  Abdomen obese soft and nontender with no " organomegaly or masses and normal bowel sounds  Neurological exam grossly normal other than chronic hearing deficit treated with hearing aids  Procedures    Results:   Labs:   Hemoglobin A1C   Date Value Ref Range Status   12/09/2022 6.0 (H) 4.8 - 5.6 % Final     Comment:              Prediabetes: 5.7 - 6.4           Diabetes: >6.4           Glycemic control for adults with diabetes: <7.0       TSH   Date Value Ref Range Status   12/09/2022 1.230 0.450 - 4.500 uIU/mL Final        POCT Results (if applicable):   Results for orders placed or performed in visit on 02/10/23   Covid-19 + Flu A&B AG, Veritor    Specimen: Swab   Result Value Ref Range    SARS Antigen Not Detected Not Detected, Presumptive Negative    Influenza A Antigen KAM Not Detected Not Detected    Influenza B Antigen KAM Not Detected Not Detected    Internal Control Passed Passed    Lot Number [id number]     Expiration Date 03/02/2023        Imaging:   No valid procedures specified.     Assessment / Plan      Assessment/Plan:   Diagnoses and all orders for this visit:    1. Viral syndrome (Primary)  -     Covid-19 + Flu A&B AG, Veritor  -     brompheniramine-pseudoephedrine-DM 30-2-10 MG/5ML syrup; 5 to 10 mL orally every 4-6 hours as needed for cold and cough symptoms  Dispense: 180 mL; Refill: 0  Rapid COVID-19 and rapid Influenza Type A and type B are all negative.  Treat symptomatically with cough and cold medication as prescribed along with fluids and Motrin or Tylenol.  Anticipate she will be well enough to return back to work for the upcoming week and this coming Monday.  Advise if not continuing to improve or for any significant regression in her symptoms.      Follow Up:   Return if symptoms worsen or fail to improve.      At [facility], we believe that sharing information builds trust and better relationships. You are receiving this note because you recently visited [facility]. It is possible you will see health information before a  provider has talked with you about it. This kind of information can be easy to misunderstand. To help you fully understand what it means for your health, we urge you to discuss this note with your provider.    Danny Boss MD  [facility]

## 2023-03-16 ENCOUNTER — OFFICE VISIT (OUTPATIENT)
Dept: FAMILY MEDICINE CLINIC | Facility: CLINIC | Age: 28
End: 2023-03-16
Payer: MEDICARE

## 2023-03-16 VITALS
TEMPERATURE: 98.6 F | SYSTOLIC BLOOD PRESSURE: 130 MMHG | BODY MASS INDEX: 43.85 KG/M2 | OXYGEN SATURATION: 98 % | HEIGHT: 67 IN | DIASTOLIC BLOOD PRESSURE: 86 MMHG | HEART RATE: 100 BPM | WEIGHT: 279.4 LBS

## 2023-03-16 DIAGNOSIS — B30.9 ACUTE VIRAL CONJUNCTIVITIS OF LEFT EYE: Primary | ICD-10-CM

## 2023-03-16 PROCEDURE — 99213 OFFICE O/P EST LOW 20 MIN: CPT | Performed by: INTERNAL MEDICINE

## 2023-03-16 PROCEDURE — 1159F MED LIST DOCD IN RCRD: CPT | Performed by: INTERNAL MEDICINE

## 2023-03-16 PROCEDURE — 1160F RVW MEDS BY RX/DR IN RCRD: CPT | Performed by: INTERNAL MEDICINE

## 2023-03-16 RX ORDER — OLOPATADINE HYDROCHLORIDE 1 MG/ML
1 SOLUTION/ DROPS OPHTHALMIC 2 TIMES DAILY
Qty: 5 ML | Refills: 0 | Status: SHIPPED | OUTPATIENT
Start: 2023-03-16

## 2023-03-16 NOTE — PROGRESS NOTES
Follow Up Office Visit      Date: 2023   Patient Name: Rosa Craig  : 1995   MRN: [id number]     Chief Complaint:    Chief Complaint   Patient presents with   • Conjunctivitis       History of Present Illness: Rosa Craig is a 27 y.o. female who is here today for acute onset this morning of left redness of the eye with some discharge and matting, that is gotten better as the days progressed.  No involvement of her right eye, has had some nasal stuffiness and sneezing assume this might be related to a cat allergy or mold but again is only in the 1 eye.  Is taking her Flonase and loratadine.  Not aware of any ill contacts.    Subjective      Review of Systems:   Review of Systems    I have reviewed the patients family history, social history, past medical history, past surgical history and have updated it as appropriate.     Medications:     Current Outpatient Medications:   •  atorvastatin (LIPITOR) 20 MG tablet, Take 1 tablet by mouth every night at bedtime., Disp: 90 tablet, Rfl: 1  •  BUSPIRONE HCL PO, Take  by mouth., Disp: , Rfl:   •  FLUoxetine (PROzac) 10 MG capsule, Take 1 capsule by mouth Daily., Disp: 90 capsule, Rfl: 1  •  fluticasone (FLONASE) 50 MCG/ACT nasal spray, 2 sprays into the nostril(s) as directed by provider Daily., Disp: , Rfl:   •  lisinopril (PRINIVIL,ZESTRIL) 5 MG tablet, Take 1 tablet by mouth Daily., Disp: 90 tablet, Rfl: 1  •  loratadine (CLARITIN) 10 MG tablet, Take 1 tablet by mouth Daily., Disp: 90 tablet, Rfl: 1  •  metFORMIN (GLUCOPHAGE) 500 MG tablet, Take 1 tablet by mouth 2 (Two) Times a Day With Meals., Disp: 180 tablet, Rfl: 1  •  norethindrone-ethinyl estradiol FE (Aurovela FE ) 1-20 MG-MCG per tablet, Take 1 tablet by mouth Daily., Disp: 28 tablet, Rfl: 3  •  vitamin D (ERGOCALCIFEROL) 1.25 MG (95257 UT) capsule capsule, Take 1 capsule by mouth Every 7 (Seven) Days., Disp: 5 capsule, Rfl: 3  •  olopatadine (Patanol) 0.1 % ophthalmic solution,  "Administer 1 drop into the left eye 2 (Two) Times a Day. As needed for conjunctivitis, Disp: 5 mL, Rfl: 0    Allergies:   Allergies   Allergen Reactions   • Elemental Sulfur Rash   • Sulfa Antibiotics Rash       Objective     Physical Exam: Please see above  Vital Signs:   Vitals:    03/16/23 1609   BP: 130/86   BP Location: Left arm   Patient Position: Sitting   Cuff Size: Adult   Pulse: 100   Temp: 98.6 °F (37 °C)   TempSrc: Temporal   SpO2: 98%   Weight: 127 kg (279 lb 6.4 oz)   Height: 170.2 cm (67\")     Body mass index is 43.76 kg/m².       Physical Exam  General: Healthy appearing 27-year-old female in no acute distress.  Head and neck: Left ocular and palpebral conjunctive a moderately injected with no current discharge, no chemosis or proptosis, no lid edema or periorbital swelling, right eye clear, TMs and canals bilaterally clear, nares minimal congestion with no rhinorrhea, oral pharynx clear, neck supple with no masses or tenderness  Lungs are clear  Cardiac regular rate rhythm with no murmurs gallops or rubs  Procedures    Results:   Labs:   Hemoglobin A1C   Date Value Ref Range Status   12/09/2022 6.0 (H) 4.8 - 5.6 % Final     Comment:              Prediabetes: 5.7 - 6.4           Diabetes: >6.4           Glycemic control for adults with diabetes: <7.0       TSH   Date Value Ref Range Status   12/09/2022 1.230 0.450 - 4.500 uIU/mL Final        POCT Results (if applicable):   Results for orders placed or performed in visit on 02/10/23   Covid-19 + Flu A&B AG, Veritor    Specimen: Swab   Result Value Ref Range    SARS Antigen Not Detected Not Detected, Presumptive Negative    Influenza A Antigen KAM Not Detected Not Detected    Influenza B Antigen KAM Not Detected Not Detected    Internal Control Passed Passed    Lot Number [id number]     Expiration Date 03/02/2023        Imaging:   No valid procedures specified.       Assessment / Plan      Assessment/Plan:   Diagnoses and all orders for this " visit:    1. Acute viral conjunctivitis of left eye (Primary)  -     olopatadine (Patanol) 0.1 % ophthalmic solution; Administer 1 drop into the left eye 2 (Two) Times a Day. As needed for conjunctivitis  Dispense: 5 mL; Refill: 0  High likelihood that this represents an infectious conjunctivitis given unilateral presentation, rather than allergy, and in this presentation also much more likely viral than bacterial, with no purulent discharge or significant redness of the surrounding orbital structures.  Treat symptomatically with Patanol drops along with cool moist compresses.  Advised of contagiousness until resolves.  Advise if not resolving over several days or for any significant worsening or development of periorbital redness or swelling.      Follow Up:   Return if symptoms worsen or fail to improve.      At [facility], we believe that sharing information builds trust and better relationships. You are receiving this note because you recently visited [facility]. It is possible you will see health information before a provider has talked with you about it. This kind of information can be easy to misunderstand. To help you fully understand what it means for your health, we urge you to discuss this note with your provider.    Danny Boss MD  [facility]   Answers for HPI/ROS submitted by the patient on 3/16/2023  Please describe your symptoms.: Pink eye like, goopy crust aroud eye lid after waking up, is allergy  Have you had these symptoms before?: Yes  How long have you been having these symptoms?: 1-4 days  Please list any medications you are currently taking for this condition.: Lotaradine  Please describe any probable cause for these symptoms. : Cat hair & dry rotted molds  What is the primary reason for your visit?: Other

## 2023-06-09 ENCOUNTER — OFFICE VISIT (OUTPATIENT)
Dept: FAMILY MEDICINE CLINIC | Facility: CLINIC | Age: 28
End: 2023-06-09
Payer: MEDICARE

## 2023-06-09 VITALS
DIASTOLIC BLOOD PRESSURE: 98 MMHG | BODY MASS INDEX: 43.44 KG/M2 | RESPIRATION RATE: 18 BRPM | HEIGHT: 67 IN | TEMPERATURE: 97.5 F | OXYGEN SATURATION: 97 % | HEART RATE: 72 BPM | WEIGHT: 276.8 LBS | SYSTOLIC BLOOD PRESSURE: 158 MMHG

## 2023-06-09 DIAGNOSIS — E11.9 TYPE 2 DIABETES MELLITUS WITHOUT COMPLICATION, WITHOUT LONG-TERM CURRENT USE OF INSULIN: Primary | ICD-10-CM

## 2023-06-09 DIAGNOSIS — Z23 NEED FOR HPV VACCINATION: ICD-10-CM

## 2023-06-09 DIAGNOSIS — I10 PRIMARY HYPERTENSION: ICD-10-CM

## 2023-06-09 DIAGNOSIS — E66.01 CLASS 3 SEVERE OBESITY DUE TO EXCESS CALORIES WITHOUT SERIOUS COMORBIDITY WITH BODY MASS INDEX (BMI) OF 40.0 TO 44.9 IN ADULT: ICD-10-CM

## 2023-06-09 LAB
EXPIRATION DATE: NORMAL
GLUCOSE BLDC GLUCOMTR-MCNC: 120 MG/DL (ref 70–130)
HBA1C MFR BLD: 6 %
Lab: NORMAL

## 2023-06-09 RX ORDER — LISINOPRIL 10 MG/1
10 TABLET ORAL DAILY
Qty: 90 TABLET | Refills: 3 | Status: SHIPPED | OUTPATIENT
Start: 2023-06-09

## 2023-06-09 RX ORDER — BLOOD SUGAR DIAGNOSTIC
1 STRIP MISCELLANEOUS DAILY
Qty: 100 STRIP | Refills: 3 | Status: SHIPPED | OUTPATIENT
Start: 2023-06-09

## 2023-06-09 RX ORDER — BLOOD-GLUCOSE METER
1 EACH MISCELLANEOUS DAILY
Qty: 1 EACH | Refills: 0 | Status: SHIPPED | OUTPATIENT
Start: 2023-06-09

## 2023-06-09 NOTE — PROGRESS NOTES
Follow Up Office Visit      Date: 2023   Patient Name: Rosa Craig  : 1995   MRN: [id number]     Chief Complaint:  No chief complaint on file.      History of Present Illness: Rosa Craig is a 27 y.o. female who is here today for 6-month review, having diabetes mellitus type 2 with no known secondary sequelae, taking metformin 500 mg twice daily with a hemoglobin A1c last of 6.0% in 2022.  Denies any neuropathic symptoms.  Not checking blood sugars as did not have a glucometer.  Also history of hypertension taking lisinopril 5 mg daily checking blood pressures once or twice weekly with systolics in the 140s although she cannot recall diastolics.  No chest pains palpitations dyspnea dizziness or edema..  History of anxiety depression taking Prozac 10 mg daily only, no longer on BuSpar, indicating her moods are doing well.  We also discussed her obesity noting she has lost 3 pounds in the last 6 months.  She indicates she walks about 2 hours 3 times weekly, and regarding diet as eliminated soda and fast foods, eating some fruits and vegetables, though does still eat junk foods and pastas.    Subjective      Review of Systems:   Review of Systems    I have reviewed the patients family history, social history, past medical history, past surgical history and have updated it as appropriate.     Medications:     Current Outpatient Medications:     atorvastatin (LIPITOR) 20 MG tablet, Take 1 tablet by mouth every night at bedtime., Disp: 90 tablet, Rfl: 1    FLUoxetine (PROzac) 10 MG capsule, Take 1 capsule by mouth Daily., Disp: 90 capsule, Rfl: 1    fluticasone (FLONASE) 50 MCG/ACT nasal spray, 2 sprays into the nostril(s) as directed by provider Daily., Disp: , Rfl:     loratadine (CLARITIN) 10 MG tablet, Take 1 tablet by mouth Daily., Disp: 90 tablet, Rfl: 1    metFORMIN (GLUCOPHAGE) 500 MG tablet, Take 1 tablet by mouth 2 (Two) Times a Day With Meals., Disp: 180 tablet, Rfl: 1     "norethindrone-ethinyl estradiol FE (Aurovela FE 1/20) 1-20 MG-MCG per tablet, Take 1 tablet by mouth Daily., Disp: 28 tablet, Rfl: 3    olopatadine (Patanol) 0.1 % ophthalmic solution, Administer 1 drop into the left eye 2 (Two) Times a Day. As needed for conjunctivitis, Disp: 5 mL, Rfl: 0    vitamin D (ERGOCALCIFEROL) 1.25 MG (18119 UT) capsule capsule, Take 1 capsule by mouth Every 7 (Seven) Days., Disp: 5 capsule, Rfl: 3    Blood Glucose Monitoring Suppl (ONE TOUCH ULTRA 2) w/Device kit, 1 Units Daily., Disp: 1 each, Rfl: 0    Glucose Blood (Blood Glucose Test Strips 333) strip, 1 Units by In Vitro route Daily., Disp: 100 strip, Rfl: 3    lisinopril (PRINIVIL,ZESTRIL) 10 MG tablet, Take 1 tablet by mouth Daily., Disp: 90 tablet, Rfl: 3    Allergies:   Allergies   Allergen Reactions    Elemental Sulfur Rash    Sulfa Antibiotics Rash       Objective     Physical Exam: Please see above  Vital Signs:   Vitals:    06/09/23 0833   BP: 158/98   BP Location: Left arm   Patient Position: Sitting   Cuff Size: Adult   Pulse: 72   Resp: 18   Temp: 97.5 °F (36.4 °C)   TempSrc: Temporal   SpO2: 97%   Weight: 126 kg (276 lb 12.8 oz)   Height: 170.2 cm (67\")     Body mass index is 43.35 kg/m².  Class 3 Severe Obesity (BMI >=40). Obesity-related health conditions include the following: hypertension, diabetes mellitus, and dyslipidemias. Obesity is improving with lifestyle modifications. BMI is is above average; BMI management plan is completed. We discussed portion control, increasing exercise, and pharmacologic options including phentermine and GLP-1 agonist .       Physical Exam  General: Average statured healthy-appearing 27-year-old female with a BMI of 43.3, weight down 3 pounds in 6 months.  Neck supple with no lymphadenopathy thyromegaly masses or tenderness  Lungs are clear with no wheeze tachypnea cough  Cardiac regular rate rhythm with no murmurs gallops or rubs  Bipedal exam with 2+ pulses, no edema, normal sensation " of both feet to fine touch vibration and pinprick, no skin breakdown, motor exam normal  Diabetic Foot Exam Performed and Monofilament Test Performed    Procedures    Results:   Labs:   Hemoglobin A1C   Date Value Ref Range Status   06/09/2023 6.0 % Final     TSH   Date Value Ref Range Status   12/09/2022 1.230 0.450 - 4.500 uIU/mL Final        POCT Results (if applicable):   Results for orders placed or performed in visit on 06/09/23   POC Glycosylated Hemoglobin (Hb A1C)    Specimen: Blood   Result Value Ref Range    Hemoglobin A1C 6.0 %    Lot Number [id number]     Expiration Date 02/28/2025    POC Glucose    Specimen: Blood   Result Value Ref Range    Glucose 120 70 - 130 mg/dL       Imaging:   No valid procedures specified.     Assessment / Plan      Assessment/Plan:   Diagnoses and all orders for this visit:    1. Type 2 diabetes mellitus without complication, without long-term current use of insulin (Primary)  -     POC Glycosylated Hemoglobin (Hb A1C)  -     POC Glucose  -     Blood Glucose Monitoring Suppl (ONE TOUCH ULTRA 2) w/Device kit; 1 Units Daily.  Dispense: 1 each; Refill: 0  -     Glucose Blood (Blood Glucose Test Strips 333) strip; 1 Units by In Vitro route Daily.  Dispense: 100 strip; Refill: 3  Diabetic control remains excellent with a hemoglobin A1c today of 6.0%, unchanged for 6 months prior.  No secondary sequelae identified.  Continue metformin 500 mg twice daily, increasing exercise, improving diet, will repeat testing in another 6 months.  2. Primary hypertension  -     lisinopril (PRINIVIL,ZESTRIL) 10 MG tablet; Take 1 tablet by mouth Daily.  Dispense: 90 tablet; Refill: 3  Stage II elevation acutely as well as chronically taking lisinopril 5 mg daily.  Increase lisinopril to 10 mg daily monitoring blood sugars at least twice weekly with ideal parameters discussed.  Continue healthy lifestyle efforts including increasing exercise, improving diet and avoiding added salt.  Reassess at next  visit.  3. Class 3 severe obesity due to excess calories without serious comorbidity with body mass index (BMI) of 40.0 to 44.9 in adult  Has had 3 pound weight loss in the last 6 months though still significantly overweight.  Discussed in her lifestyle, patient making efforts, and also discussed potential weight loss treatment options including phentermine and GLP-1 agonist, though coverage of these products is uncertain.  She wishes to hold off medication and reassess in 6 months.  4. Need for HPV vaccination  -     HPV Vaccine        Vaccine Counseling:  “Discussed risks/benefits to vaccination, reviewed components of the vaccine, discussed VIS, discussed informed consent, informed consent obtained. Patient/Parent was allowed to accept or refuse vaccine. Questions answered to satisfactory state of patient/Parent. We reviewed typical age appropriate and seasonally appropriate vaccinations. Reviewed immunization history and updated state vaccination form as needed. Patient was counseled on HPV    Follow Up:   Return in about 6 months (around 12/9/2023) for Annual physical.      At [facility], we believe that sharing information builds trust and better relationships. You are receiving this note because you recently visited [facility]. It is possible you will see health information before a provider has talked with you about it. This kind of information can be easy to misunderstand. To help you fully understand what it means for your health, we urge you to discuss this note with your provider.    Danny Boss MD  [facility]

## 2023-10-16 RX ORDER — ERGOCALCIFEROL 1.25 MG/1
50000 CAPSULE ORAL
Qty: 5 CAPSULE | Refills: 3 | Status: SHIPPED | OUTPATIENT
Start: 2023-10-16

## 2023-10-17 ENCOUNTER — OFFICE VISIT (OUTPATIENT)
Dept: FAMILY MEDICINE CLINIC | Facility: CLINIC | Age: 28
End: 2023-10-17
Payer: MEDICARE

## 2023-10-17 VITALS — BODY MASS INDEX: 43.32 KG/M2 | TEMPERATURE: 98.2 F | HEIGHT: 67 IN | WEIGHT: 276 LBS

## 2023-10-17 DIAGNOSIS — B34.9 VIRAL SYNDROME: ICD-10-CM

## 2023-10-17 DIAGNOSIS — E11.9 TYPE 2 DIABETES MELLITUS WITHOUT COMPLICATION, WITHOUT LONG-TERM CURRENT USE OF INSULIN: Primary | ICD-10-CM

## 2023-10-17 RX ORDER — ONDANSETRON HYDROCHLORIDE 8 MG/1
8 TABLET, FILM COATED ORAL EVERY 8 HOURS PRN
Qty: 20 TABLET | Refills: 0 | Status: SHIPPED | OUTPATIENT
Start: 2023-10-17

## 2023-10-17 RX ORDER — LOPERAMIDE HYDROCHLORIDE 2 MG/1
2 TABLET ORAL 4 TIMES DAILY PRN
Qty: 15 TABLET | Refills: 0 | Status: SHIPPED | OUTPATIENT
Start: 2023-10-17

## 2023-10-17 NOTE — PROGRESS NOTES
Office Note     Name: Rosa Craig    : 1995     MRN: [id number]     Chief Complaint  Vomiting, Diarrhea, and Nausea    Subjective     History of Present Illness:  Rosa Craig is a 28 y.o. female who presents today for acute complaints of nausea, vomiting, diarrhea.  Patient reports symptoms began 4 days ago.  She notes initial symptoms of vomiting, over the next few days after onset patient felt as though her symptoms were worsening, she was even able to work yesterday.  However, this morning she had recurrence of nausea with onset of liquid frequent stools.  She has only utilized Pepto-Bismol without much benefit.  She has been able to sip liquids and maintain adequate hydration with good urinary output.  She denies any abdominal pain or discomfort.  She fell initially perhaps she was suffering from food poisoning after eating a pink hamburger, however she found out today several of her coworkers are suffering from the same gastric viral symptoms.  No fever, sore throat or sinus congestion.  She has no further complaints or concerns today    Review of Systems   Constitutional:  Positive for fatigue. Negative for chills and fever.   HENT:  Negative for ear pain and sore throat.    Respiratory:  Negative for cough.    Gastrointestinal:  Positive for diarrhea, nausea and vomiting. Negative for abdominal distention, abdominal pain, constipation, GERD and indigestion.   Endocrine: Negative for polydipsia and polyuria.   Musculoskeletal:  Negative for myalgias.   Neurological:  Negative for dizziness, weakness, light-headedness and headache.       Objective     Past Medical History:   Diagnosis Date    Acute pharyngitis     Allergic     Allergic rhinitis due to pollen     Anxiety     Asthma     Congenital sensorineural hearing loss     Depression     Dysthymic disorder     Elevated blood pressure reading without diagnosis of hypertension     Ganglion cyst     Generalized anxiety disorder      "Microalbuminuria     Mixed hyperlipidemia     Obesity     Onychomycosis     bilateral great toenail    Other fatigue     Other proteinuria     Polycystic ovarian syndrome     Prediabetes     Proteinuria     Tinea unguium     Viral infection     Vitamin D deficiency     Vitamin D deficiency      Past Surgical History:   Procedure Laterality Date    EXTERNAL EAR SURGERY Bilateral      Family History   Problem Relation Age of Onset    Alcohol abuse Mother     Cirrhosis Mother     Depression Mother     Hepatitis Mother     Breast cancer Mother     Alcohol abuse Father     Arthritis Father     Bone cancer Maternal Grandmother     COPD Maternal Grandmother     Heart attack Maternal Grandfather     Breast cancer Paternal Aunt        Vital Signs  Temp 98.2 °F (36.8 °C) (Temporal)   Ht 170.2 cm (67\")   Wt 125 kg (276 lb)   BMI 43.23 kg/m²   Estimated body mass index is 43.23 kg/m² as calculated from the following:    Height as of this encounter: 170.2 cm (67\").    Weight as of this encounter: 125 kg (276 lb).    Physical Exam  Vitals reviewed.   Constitutional:       Appearance: Normal appearance.   HENT:      Head: Normocephalic and atraumatic.      Nose: Nose normal.      Mouth/Throat:      Mouth: Mucous membranes are moist.      Pharynx: Oropharynx is clear.   Eyes:      Conjunctiva/sclera: Conjunctivae normal.   Cardiovascular:      Rate and Rhythm: Normal rate and regular rhythm.      Pulses: Normal pulses.      Heart sounds: Normal heart sounds.   Pulmonary:      Effort: Pulmonary effort is normal.      Breath sounds: Normal breath sounds.   Abdominal:      General: Bowel sounds are normal. There is no distension.      Palpations: Abdomen is soft. There is no mass.      Tenderness: There is no abdominal tenderness. There is no guarding.   Skin:     General: Skin is warm and dry.   Neurological:      Mental Status: She is alert and oriented to person, place, and time.               POCT Results (if " applicable):  Results for orders placed or performed in visit on 10/17/23   POCT SARS-CoV-2 + Flu Antigen KAM    Specimen: Swab   Result Value Ref Range    SARS Antigen Not Detected Not Detected, Presumptive Negative    Influenza A Antigen KAM Not Detected Not Detected    Influenza B Antigen KAM Not Detected Not Detected    Internal Control Passed Passed    Lot Number [id number]     Expiration Date 10/27/2024             Assessment and Plan     Diagnoses and all orders for this visit:    1. Type 2 diabetes mellitus without complication, without long-term current use of insulin (Primary)  Assessment & Plan:  Patient reports only checking glucose at home once to twice weekly as needed.  Currently utilizing metformin 500 mg twice daily with meals.  She reports general a.m. fasting readings in the 90s.  No issues with hypo or hyperglycemia during GI illness.      2. Viral syndrome  Assessment & Plan:  presents today for acute complaints of nausea, vomiting, diarrhea.  Patient reports symptoms began 4 days ago.  She notes initial symptoms of vomiting, over the next few days after onset patient felt as though her symptoms were worsening, she was even able to work yesterday.  However, this morning she had recurrence of nausea with onset of liquid frequent stools.  She has only utilized Pepto-Bismol without much benefit.  She has been able to sip liquids and maintain adequate hydration with good urinary output.  She denies any abdominal pain or discomfort.  She fell initially perhaps she was suffering from food poisoning after eating a pink hamburger, however she found out today several of her coworkers are suffering from the same gastric viral symptoms.  No fever, sore throat or sinus congestion.  Appears to be viral syndrome with predominantly gastric symptoms.  -Sending prescription in for antidiarrheal and Zofran to address nausea, vomiting and diarrhea  -Patient advised to continue adequate hydration.  Once nausea  settles may progress diet as tolerated, starting with crackers and toast.  -Educated on signs of dehydration including dry mucous membranes or decreased urinary output which would warrant presenting to emergency department for IV fluids    Orders:  -     ondansetron (Zofran) 8 MG tablet; Take 1 tablet by mouth Every 8 (Eight) Hours As Needed for Nausea or Vomiting.  Dispense: 20 tablet; Refill: 0  -     loperamide (IMODIUM A-D) 2 MG tablet; Take 1 tablet by mouth 4 (Four) Times a Day As Needed for Diarrhea.  Dispense: 15 tablet; Refill: 0  -     POCT SARS-CoV-2 + Flu Antigen KAM               Follow Up  No follow-ups on file.    Rosalinda Way, APRN

## 2023-10-17 NOTE — ASSESSMENT & PLAN NOTE
presents today for acute complaints of nausea, vomiting, diarrhea.  Patient reports symptoms began 4 days ago.  She notes initial symptoms of vomiting, over the next few days after onset patient felt as though her symptoms were worsening, she was even able to work yesterday.  However, this morning she had recurrence of nausea with onset of liquid frequent stools.  She has only utilized Pepto-Bismol without much benefit.  She has been able to sip liquids and maintain adequate hydration with good urinary output.  She denies any abdominal pain or discomfort.  She fell initially perhaps she was suffering from food poisoning after eating a pink hamburger, however she found out today several of her coworkers are suffering from the same gastric viral symptoms.  No fever, sore throat or sinus congestion.  Appears to be viral syndrome with predominantly gastric symptoms.  -Sending prescription in for antidiarrheal and Zofran to address nausea, vomiting and diarrhea  -Patient advised to continue adequate hydration.  Once nausea settles may progress diet as tolerated, starting with crackers and toast.  -Educated on signs of dehydration including dry mucous membranes or decreased urinary output which would warrant presenting to emergency department for IV fluids

## 2023-10-17 NOTE — ASSESSMENT & PLAN NOTE
Patient reports only checking glucose at home once to twice weekly as needed.  Currently utilizing metformin 500 mg twice daily with meals.  She reports general a.m. fasting readings in the 90s.  No issues with hypo or hyperglycemia during GI illness.

## 2023-12-07 ENCOUNTER — OFFICE VISIT (OUTPATIENT)
Dept: FAMILY MEDICINE CLINIC | Facility: CLINIC | Age: 28
End: 2023-12-07
Payer: MEDICARE

## 2023-12-07 VITALS
BODY MASS INDEX: 44.57 KG/M2 | SYSTOLIC BLOOD PRESSURE: 126 MMHG | WEIGHT: 284 LBS | DIASTOLIC BLOOD PRESSURE: 84 MMHG | TEMPERATURE: 98.2 F | OXYGEN SATURATION: 98 % | HEART RATE: 92 BPM | HEIGHT: 67 IN

## 2023-12-07 DIAGNOSIS — H60.391 OTHER INFECTIVE ACUTE OTITIS EXTERNA OF RIGHT EAR: Primary | ICD-10-CM

## 2023-12-07 DIAGNOSIS — I10 PRIMARY HYPERTENSION: ICD-10-CM

## 2023-12-07 DIAGNOSIS — H61.21 IMPACTED CERUMEN OF RIGHT EAR: ICD-10-CM

## 2023-12-07 PROBLEM — H60.90 OTITIS EXTERNA: Status: ACTIVE | Noted: 2023-12-07

## 2023-12-07 PROCEDURE — 3074F SYST BP LT 130 MM HG: CPT | Performed by: INTERNAL MEDICINE

## 2023-12-07 PROCEDURE — 3079F DIAST BP 80-89 MM HG: CPT | Performed by: INTERNAL MEDICINE

## 2023-12-07 PROCEDURE — 3044F HG A1C LEVEL LT 7.0%: CPT | Performed by: INTERNAL MEDICINE

## 2023-12-07 PROCEDURE — 99214 OFFICE O/P EST MOD 30 MIN: CPT | Performed by: INTERNAL MEDICINE

## 2023-12-07 PROCEDURE — 1159F MED LIST DOCD IN RCRD: CPT | Performed by: INTERNAL MEDICINE

## 2023-12-07 PROCEDURE — 1160F RVW MEDS BY RX/DR IN RCRD: CPT | Performed by: INTERNAL MEDICINE

## 2023-12-07 RX ORDER — AMOXICILLIN AND CLAVULANATE POTASSIUM 875; 125 MG/1; MG/1
1 TABLET, FILM COATED ORAL 2 TIMES DAILY
Qty: 14 TABLET | Refills: 0 | Status: SHIPPED | OUTPATIENT
Start: 2023-12-07 | End: 2023-12-14

## 2023-12-07 RX ORDER — NEOMYCIN SULFATE, POLYMYXIN B SULFATE AND HYDROCORTISONE 10; 3.5; 1 MG/ML; MG/ML; [USP'U]/ML
3 SUSPENSION/ DROPS AURICULAR (OTIC) 4 TIMES DAILY
Qty: 5 ML | Refills: 0 | Status: SHIPPED | OUTPATIENT
Start: 2023-12-07 | End: 2023-12-14

## 2023-12-07 NOTE — PROGRESS NOTES
Follow Up Office Visit      Date: 2023   Patient Name: Rosa Craig  : 1995   MRN: [id number]     Chief Complaint:    Chief Complaint   Patient presents with    Follow-up    Earache       History of Present Illness: Rosa Craig is a 28 y.o. female who is here today for onset 3 days ago of pain in the right periauricular region more noted anterior to the tragus.  Not aware of any discharge but does have some itching discomfort in the external auditory meatus.  Notes her hearing is diminished though she does have chronic hearing deficit bilaterally for which she requires hearing aids.  No nasal congestion, no sore throat, no neck discomfort, no fevers chills or sweats.  She also has hypertension for which she takes lisinopril 10 mg daily, with note of a normal blood pressure today in the office.  No chest pains palpitations dyspnea or edema.  She is formally scheduled for a complete physical next week..    Subjective      Review of Systems:   Review of Systems    I have reviewed the patients family history, social history, past medical history, past surgical history and have updated it as appropriate.     Medications:     Current Outpatient Medications:     atorvastatin (LIPITOR) 20 MG tablet, Take 1 tablet by mouth every night at bedtime., Disp: 90 tablet, Rfl: 1    Blood Glucose Monitoring Suppl (ONE TOUCH ULTRA 2) w/Device kit, 1 Units Daily., Disp: 1 each, Rfl: 0    fluticasone (FLONASE) 50 MCG/ACT nasal spray, 2 sprays into the nostril(s) as directed by provider Daily., Disp: , Rfl:     Glucose Blood (Blood Glucose Test Strips 333) strip, 1 Units by In Vitro route Daily., Disp: 100 strip, Rfl: 3    lisinopril (PRINIVIL,ZESTRIL) 10 MG tablet, Take 1 tablet by mouth Daily., Disp: 90 tablet, Rfl: 3    loratadine (CLARITIN) 10 MG tablet, Take 1 tablet by mouth Daily., Disp: 90 tablet, Rfl: 1    metFORMIN (GLUCOPHAGE) 500 MG tablet, Take 1 tablet by mouth 2 (Two) Times a Day With Meals., Disp: 180  "tablet, Rfl: 1    norethindrone-ethinyl estradiol FE (Aurovela FE 1/20) 1-20 MG-MCG per tablet, Take 1 tablet by mouth Daily., Disp: 28 tablet, Rfl: 3    vitamin D (ERGOCALCIFEROL) 1.25 MG (44974 UT) capsule capsule, TAKE 1 CAPSULE BY MOUTH EVERY 7 DAYS, Disp: 5 capsule, Rfl: 3    amoxicillin-clavulanate (AUGMENTIN) 875-125 MG per tablet, Take 1 tablet by mouth 2 (Two) Times a Day for 7 days., Disp: 14 tablet, Rfl: 0    neomycin-polymyxin-hydrocortisone (CORTISPORIN) 3.5-58763-2 otic suspension, Administer 3 drops to the right ear 4 (Four) Times a Day for 7 days., Disp: 5 mL, Rfl: 0    Allergies:   Allergies   Allergen Reactions    Elemental Sulfur Rash    Sulfa Antibiotics Rash       Objective     Physical Exam: Please see above  Vital Signs:   Vitals:    12/07/23 1407   BP: 126/84   BP Location: Left arm   Patient Position: Sitting   Cuff Size: Adult   Pulse: 92   Temp: 98.2 °F (36.8 °C)   TempSrc: Temporal   SpO2: 98%   Weight: 129 kg (284 lb)   Height: 170.2 cm (67\")     Body mass index is 44.48 kg/m².          Physical Exam  General: Taller statured healthy 28-year-old female, hard of hearing wearing hearing aids chronically, BMI 44.4 reflecting an 8 pound weight gain in the last 2 months.  Head and neck: Normocephalic atraumatic, bilateral hearing aids in place.  Left TM and canal clear and nontender, no pain to manipulation of the left ear pinna, right ear canal with discomfort instrumentation of an ear speculum with no obvious erythema or discharge, having deep cerumen impaction precluding assessment of the TM, significant tenderness palpation of the tragus as well as periauricular region anteriorly with just discomfort exacerbated by manipulation of the pinna, nares clear, oropharynx clear, moist membranes, neck with no adenopathy masses or tenderness  Lungs clear  Cardiac regular rate rhythm with no murmurs gallops or rubs  Neurological exam with chronic hearing deficit bilaterally otherwise " nonfocal    Procedures    Results:   Labs:   Hemoglobin A1C   Date Value Ref Range Status   06/09/2023 6.0 % Final     TSH   Date Value Ref Range Status   12/09/2022 1.230 0.450 - 4.500 uIU/mL Final        POCT Results (if applicable):   Results for orders placed or performed in visit on 10/17/23   POCT SARS-CoV-2 + Flu Antigen KAM    Specimen: Swab   Result Value Ref Range    SARS Antigen Not Detected Not Detected, Presumptive Negative    Influenza A Antigen KAM Not Detected Not Detected    Influenza B Antigen KAM Not Detected Not Detected    Internal Control Passed Passed    Lot Number [id number]     Expiration Date 10/27/2024            Assessment / Plan      Assessment/Plan:   Diagnoses and all orders for this visit:    1. Other infective acute otitis externa of right ear (Primary)  -     amoxicillin-clavulanate (AUGMENTIN) 875-125 MG per tablet; Take 1 tablet by mouth 2 (Two) Times a Day for 7 days.  Dispense: 14 tablet; Refill: 0  -     neomycin-polymyxin-hydrocortisone (CORTISPORIN) 3.5-07185-8 otic suspension; Administer 3 drops to the right ear 4 (Four) Times a Day for 7 days.  Dispense: 5 mL; Refill: 0  Acute discomfort in the right external auditory meatus and surrounding area regular structures, with no obvious inflammation or discharge in the ear canal, suspecting early external otitis and possibly some associated early cellulitis.  Treat empirically with Cortisporin otic suspension with proper dosing technique discussed, and Augmentin, reassessing clinically when she comes in for her complete physical in 5 days.  2. Impacted cerumen of right ear  Deep right sided cerumen impaction, exacerbating her chronic hearing loss.  Will assess clinical response to eardrop application, with consideration for ear canal flushing next week at her complete physical if not improving.  3. Primary hypertension  Good blood pressure control acutely taking her lisinopril 10 mg daily.  Will reassess at her upcoming complete  physical.    Follow Up:   Return if symptoms worsen or fail to improve.      At [facility], we believe that sharing information builds trust and better relationships. You are receiving this note because you recently visited [facility]. It is possible you will see health information before a provider has talked with you about it. This kind of information can be easy to misunderstand. To help you fully understand what it means for your health, we urge you to discuss this note with your provider.    Danny Boss MD  [facility]

## 2023-12-12 ENCOUNTER — OFFICE VISIT (OUTPATIENT)
Dept: FAMILY MEDICINE CLINIC | Facility: CLINIC | Age: 28
End: 2023-12-12
Payer: MEDICARE

## 2023-12-12 VITALS
WEIGHT: 284.4 LBS | HEART RATE: 86 BPM | HEIGHT: 67 IN | BODY MASS INDEX: 44.64 KG/M2 | DIASTOLIC BLOOD PRESSURE: 76 MMHG | OXYGEN SATURATION: 98 % | TEMPERATURE: 97.6 F | SYSTOLIC BLOOD PRESSURE: 118 MMHG

## 2023-12-12 DIAGNOSIS — E11.9 TYPE 2 DIABETES MELLITUS WITHOUT COMPLICATION, WITHOUT LONG-TERM CURRENT USE OF INSULIN: ICD-10-CM

## 2023-12-12 DIAGNOSIS — Z23 INFLUENZA VACCINATION ADMINISTERED AT CURRENT VISIT: ICD-10-CM

## 2023-12-12 DIAGNOSIS — E78.2 MIXED HYPERLIPIDEMIA: ICD-10-CM

## 2023-12-12 DIAGNOSIS — Z13.29 SCREENING FOR THYROID DISORDER: ICD-10-CM

## 2023-12-12 DIAGNOSIS — F41.9 ANXIETY: ICD-10-CM

## 2023-12-12 DIAGNOSIS — E55.9 VITAMIN D DEFICIENCY: ICD-10-CM

## 2023-12-12 DIAGNOSIS — I10 PRIMARY HYPERTENSION: ICD-10-CM

## 2023-12-12 DIAGNOSIS — E66.01 CLASS 3 SEVERE OBESITY DUE TO EXCESS CALORIES WITHOUT SERIOUS COMORBIDITY WITH BODY MASS INDEX (BMI) OF 40.0 TO 44.9 IN ADULT: ICD-10-CM

## 2023-12-12 DIAGNOSIS — F32.A DEPRESSION, UNSPECIFIED DEPRESSION TYPE: ICD-10-CM

## 2023-12-12 DIAGNOSIS — Z00.01 ENCOUNTER FOR GENERAL ADULT MEDICAL EXAMINATION WITH ABNORMAL FINDINGS: Primary | ICD-10-CM

## 2023-12-12 DIAGNOSIS — H90.5 CONGENITAL DEAFNESS: ICD-10-CM

## 2023-12-12 DIAGNOSIS — E28.2 POLYCYSTIC OVARIAN SYNDROME: ICD-10-CM

## 2023-12-12 PROBLEM — R73.03 PREDIABETES: Status: RESOLVED | Noted: 2022-12-08 | Resolved: 2023-12-12

## 2023-12-12 LAB
EXPIRATION DATE: ABNORMAL
GLUCOSE BLDC GLUCOMTR-MCNC: 127 MG/DL (ref 70–130)
HBA1C MFR BLD: 6.1 % (ref 4.5–5.7)
Lab: ABNORMAL

## 2023-12-12 NOTE — PROGRESS NOTES
Venipuncture Blood Specimen Collection  Venipuncture performed in right arm by Miguelina Lockett MA with good hemostasis. Patient tolerated the procedure well without complications.   12/12/23   Miguelina Lockett MA

## 2023-12-12 NOTE — PROGRESS NOTES
The ABCs of the Annual Wellness Visit  Subsequent Medicare Wellness Visit    Nurys Craig is a 28 y.o. female who presents for a Subsequent Medicare Wellness Visit.    The following portions of the patient's history were reviewed and   updated as appropriate: allergies, current medications, past family history, past medical history, past social history, past surgical history, and problem list.    Compared to one year ago, the patient feels her physical   health is the same.    Compared to one year ago, the patient feels her mental   health is the same.    Recent Hospitalizations:  She was not admitted to the hospital during the last year.       Current Medical Providers:  Patient Care Team:  Danny Boss MD as PCP - General (Internal Medicine)    Outpatient Medications Prior to Visit   Medication Sig Dispense Refill    amoxicillin-clavulanate (AUGMENTIN) 875-125 MG per tablet Take 1 tablet by mouth 2 (Two) Times a Day for 7 days. 14 tablet 0    atorvastatin (LIPITOR) 20 MG tablet Take 1 tablet by mouth every night at bedtime. 90 tablet 1    Blood Glucose Monitoring Suppl (ONE TOUCH ULTRA 2) w/Device kit 1 Units Daily. 1 each 0    fluticasone (FLONASE) 50 MCG/ACT nasal spray 2 sprays into the nostril(s) as directed by provider Daily.      Glucose Blood (Blood Glucose Test Strips 333) strip 1 Units by In Vitro route Daily. 100 strip 3    lisinopril (PRINIVIL,ZESTRIL) 10 MG tablet Take 1 tablet by mouth Daily. 90 tablet 3    loratadine (CLARITIN) 10 MG tablet Take 1 tablet by mouth Daily. 90 tablet 1    metFORMIN (GLUCOPHAGE) 500 MG tablet Take 1 tablet by mouth 2 (Two) Times a Day With Meals. 180 tablet 1    neomycin-polymyxin-hydrocortisone (CORTISPORIN) 3.5-78335-8 otic suspension Administer 3 drops to the right ear 4 (Four) Times a Day for 7 days. 5 mL 0    norethindrone-ethinyl estradiol FE (Aurovela FE 1/20) 1-20 MG-MCG per tablet Take 1 tablet by mouth Daily. 28 tablet 3    vitamin D  "(ERGOCALCIFEROL) 1.25 MG (71699 UT) capsule capsule TAKE 1 CAPSULE BY MOUTH EVERY 7 DAYS 5 capsule 3     No facility-administered medications prior to visit.       No opioid medication identified on active medication list. I have reviewed chart for other potential  high risk medication/s and harmful drug interactions in the elderly.        Aspirin is not on active medication list.  Aspirin use is not indicated based on review of current medical condition/s. Risk of harm outweighs potential benefits.  .    Patient Active Problem List   Diagnosis    Allergic rhinitis due to pollen    Asthma    Mixed hyperlipidemia    Obesity    Polycystic ovarian syndrome    Vitamin D deficiency    Anxiety    Depression    Type 2 diabetes mellitus without complication, without long-term current use of insulin    Primary hypertension    Viral syndrome    Impacted cerumen of right ear    Otitis externa    Encounter for general adult medical examination with abnormal findings    Influenza vaccination administered at current visit    Screening for thyroid disorder    Congenital deafness     Advance Care Planning   Advance Care Planning     Advance Directive is not on file.  ACP discussion was held with the patient during this visit. Patient does not have an advance directive, information provided.     Objective    Vitals:    12/12/23 0903   BP: 118/76   BP Location: Left arm   Patient Position: Sitting   Cuff Size: Adult   Pulse: 86   Temp: 97.6 °F (36.4 °C)   TempSrc: Temporal   SpO2: 98%   Weight: 129 kg (284 lb 6.4 oz)   Height: 170.2 cm (67\")     Estimated body mass index is 44.54 kg/m² as calculated from the following:    Height as of this encounter: 170.2 cm (67\").    Weight as of this encounter: 129 kg (284 lb 6.4 oz).           Does the patient have evidence of cognitive impairment? No    Lab Results   Component Value Date    HGBA1C 6.1 (A) 12/12/2023        HEALTH RISK ASSESSMENT    Smoking Status:  Social History     Tobacco Use "   Smoking Status Never   Smokeless Tobacco Never     Alcohol Consumption:  Social History     Substance and Sexual Activity   Alcohol Use Never     Fall Risk Screen:    STEADI Fall Risk Assessment was completed, and patient is at LOW risk for falls.Assessment completed on:2023    Depression Screenin/12/2023     9:06 AM   PHQ-2/PHQ-9 Depression Screening   Little Interest or Pleasure in Doing Things 0-->not at all   Feeling Down, Depressed or Hopeless 0-->not at all   PHQ-9: Brief Depression Severity Measure Score 0       Health Habits and Functional and Cognitive Screening:       No data to display                Age-appropriate Screening Schedule:  Refer to the list below for future screening recommendations based on patient's age, sex and/or medical conditions. Orders for these recommended tests are listed in the plan section. The patient has been provided with a written plan.    Health Maintenance   Topic Date Due    Hepatitis B (3 of 3 - 3-dose series) 2007    PAP SMEAR  2023    LIPID PANEL  2023    COVID-19 Vaccine (4 - 2023-24 season) 2023    DIABETIC EYE EXAM  2023    ANNUAL WELLNESS VISIT  2023    URINE MICROALBUMIN  2023    DIABETIC FOOT EXAM  2024    BMI FOLLOWUP  2024    HEMOGLOBIN A1C  2024    TDAP/TD VACCINES (2 - Td or Tdap) 2032    HEPATITIS C SCREENING  Completed    Pneumococcal Vaccine 0-64  Completed    INFLUENZA VACCINE  Completed    CHLAMYDIA SCREENING  Discontinued                  CMS Preventative Services Quick Reference  Risk Factors Identified During Encounter  Hearing Problem:  wears hearing aides2  Immunizations Discussed/Encouraged: Influenza and COVID19  Dental Screening Recommended  Vision Screening Recommended  The above risks/problems have been discussed with the patient.  Pertinent information has been shared with the patient in the After Visit Summary.  An After Visit Summary and PPPS were made  "available to the patient.    Follow Up:   Next Medicare Wellness visit to be scheduled in 1 year.       Additional E&M Note during same encounter follows:  Patient has multiple medical problems which are significant and separately identifiable that require additional work above and beyond the Medicare Wellness Visit.      Chief Complaint  Annual Exam    Subjective      HPI  Rosa Craig is also being seen today for general health review and checkup.  Seen 5 days ago with an acute right otitis externa taking cortisone otic suspension and Augmentin with resolution of her pain.  She does have congenital deafness wearing hearing aids typically, not wearing them today, can lip read fairly well.  She does have Medicare based upon her hearing deficit, working in a work assisted program, having no physical limitations.  She has PCOS and diabetes, taking metformin 500 mg twice daily, blood sugars fasting averaging in the 80s to 90s, no secondary sequelae, hemoglobin A1c today 6.1%, hypertension taking lisinopril 10 mg daily not checking blood pressures routinely, normal tensive today, also has obesity which has been progressive patient acknowledging suboptimal diet and sedentary lifestyle, noting she has joined the [employer] and intends to start working out.  Also with dyslipidemia on atorvastatin 20 mg nightly, anxiety and depression in the past not currently taking fluoxetine or buspirone doing well, vitamin D deficiency on high-dose supplement, and seasonal allergic rhinitis with use of Flonase and Claritin.  Periodic health maintenance due for a Pap smear update with schedule by history 1/2024 through GYN, requires influenza and COVID-19 boosters, updated diabetic eye evaluation, as well as routine laboratory testing.         Objective   Vital Signs:  /76 (BP Location: Left arm, Patient Position: Sitting, Cuff Size: Adult)   Pulse 86   Temp 97.6 °F (36.4 °C) (Temporal)   Ht 170.2 cm (67\")   Wt 129 kg (284 lb 6.4 " oz)   SpO2 98%   BMI 44.54 kg/m²     Physical Exam  Vitals and nursing note reviewed.   Constitutional:       Appearance: Normal appearance. She is obese.      Comments: Pleasant healthy alert and oriented 28-year-old female, BMI of 44.5 with weight stable in the last 5 days, increased 8 pounds over the last 2 months, congenital deafness currently lipreading not wearing her hearing aids   HENT:      Head: Normocephalic and atraumatic.      Right Ear: Ear canal and external ear normal. There is impacted cerumen.      Left Ear: Ear canal and external ear normal. There is impacted cerumen.      Ears:      Comments: Bilateral cerumen impactions precluding assessment of the TMs, no inflammation of the canals nor tenderness on either side representing normalization of her pain noted 5 days ago, congenital deafness normally wearing hearing aids     Nose: Nose normal.      Mouth/Throat:      Mouth: Mucous membranes are moist.      Pharynx: Oropharynx is clear.   Eyes:      Extraocular Movements: Extraocular movements intact.      Conjunctiva/sclera: Conjunctivae normal.      Pupils: Pupils are equal, round, and reactive to light.   Neck:      Comments: No cervical adenopathy masses or tenderness, no periclavicular or axillary or inguinal adenopathy, full cervical range of motion  Cardiovascular:      Rate and Rhythm: Normal rate and regular rhythm.      Pulses: Normal pulses.      Heart sounds: Normal heart sounds. No murmur heard.     No friction rub. No gallop.      Comments: 2+ carotids, 2+ radial, 2+ femoral and 2+ bipedal pulses with good perfusion and no edema  Pulmonary:      Effort: Pulmonary effort is normal.      Breath sounds: Normal breath sounds.      Comments: No cough wheeze or dyspnea  Chest:      Comments: Breast exam deferred today  Abdominal:      General: Bowel sounds are normal.      Palpations: Abdomen is soft. There is no mass.      Tenderness: There is no abdominal tenderness.      Hernia: No  hernia is present.      Comments: Nontender nondistended with no organomegaly or masses   Genitourinary:     Comments: Breast and pelvic exams deferred to GYN given upcoming GYN appointment with no related acute concerns  Musculoskeletal:         General: No swelling, tenderness or deformity. Normal range of motion.      Cervical back: Normal range of motion and neck supple. No rigidity or tenderness.      Right lower leg: No edema.      Left lower leg: No edema.   Lymphadenopathy:      Cervical: No cervical adenopathy.      Upper Body:      Right upper body: No supraclavicular or axillary adenopathy.      Left upper body: No supraclavicular or axillary adenopathy.   Skin:     General: Skin is warm and dry.      Capillary Refill: Capillary refill takes less than 2 seconds.      Findings: No lesion or rash.   Neurological:      Mental Status: She is alert. Mental status is at baseline.      Cranial Nerves: No cranial nerve deficit.      Sensory: No sensory deficit.      Motor: No weakness.      Coordination: Coordination normal.      Comments: Congenital deafness improved when wearing hearing aids, nonfocal otherwise  Bipedal exam with 2+ DP and 2+ PT pulses bilaterally with normal sensation in both feet to fine touch vibration and pinprick, no edema, no skin breakdown, motor exam normal   Psychiatric:         Mood and Affect: Mood normal.         Behavior: Behavior normal.         Thought Content: Thought content normal.         Judgment: Judgment normal.   Diabetic Foot Exam Performed and Monofilament Test Performed        ECG 12 Lead    Date/Time: 12/12/2023 9:34 AM  Performed by: Danny Boss MD    Authorized by: Danny Boss MD  Comparison: compared with previous ECG from 12/3/2021  Similar to previous ECG  Comparison to previous ECG: Normal sinus rhythm rate 75 with IVCD, no acute ischemic changes, no significant change versus previous EKG            Assessment and Plan   Diagnoses and all orders for  this visit:    1. Encounter for general adult medical examination with abnormal findings (Primary)  -     POC Glycosylated Hemoglobin (Hb A1C)  -     POC Glucose  -     TSH Rfx On Abnormal To Free T4; Future  -     Comprehensive Metabolic Panel; Future  -     Lipid Panel; Future  -     Urinalysis With Culture If Indicated -; Future  -     MicroAlbumin, Urine, Random - Urine, Clean Catch; Future  -     Vitamin D,25-Hydroxy; Future  Clinically very stable, major concern being her obesity discussed as detailed below, to update Pap smear through GYN, recommended immunizations to be updated, diabetic eye evaluation required, and update labs as noted.  2. Type 2 diabetes mellitus without complication, without long-term current use of insulin  -     POC Glycosylated Hemoglobin (Hb A1C)  -     POC Glucose  -     Comprehensive Metabolic Panel; Future  -     MicroAlbumin, Urine, Random - Urine, Clean Catch; Future  Hemoglobin A1c today satisfactory at 6.1% taking metformin 500 mg twice daily.  Repeat testing in 6 months, discussed need for improved lifestyle with healthy diet and regular physical activity.  3. Primary hypertension  -     Comprehensive Metabolic Panel; Future  -     Urinalysis With Culture If Indicated -; Future  -     ECG 12 Lead  Very satisfactory blood pressure control acutely as well as by history chronically taking lisinopril 10 mg daily.  Improve lifestyle.  4. Mixed hyperlipidemia  -     Lipid Panel; Future  Currently on atorvastatin 20 mg nightly.  Update lipid profile.  5. Vitamin D deficiency  -     Vitamin D,25-Hydroxy; Future    6. Class 3 severe obesity due to excess calories without serious comorbidity with body mass index (BMI) of 40.0 to 44.9 in adult  8 pound weight gain in last several months, advised need to improve diet and increase regular physical activity.  She indicates that she has joined the [facility].  7. Polycystic ovarian syndrome  On metformin 500 mg twice daily.  To follow-up with  GYN.  8. Anxiety  Moods reportedly doing well taking no related medications at this time, previously on fluoxetine and BuSpar.  9. Depression, unspecified depression type  Refer to above, doing well on no related meds.  10. Congenital deafness  Wears bilateral hearing aids.  Can lip read quite well.  11. Screening for thyroid disorder  -     TSH Rfx On Abnormal To Free T4; Future    12. Influenza vaccination administered at current visit  -     Fluzone (or Fluarix & Flulaval for VFC) >6mos  Recommended obtaining hepatitis B and COVID-19 vaccine outside the office.    Work-related form filled out and provided to patient.       Follow Up   Return in about 6 months (around 6/12/2024) for Recheck, Labs today.  Patient was given instructions and counseling regarding her condition or for health maintenance advice. Please see specific information pulled into the AVS if appropriate.

## 2023-12-13 ENCOUNTER — TELEPHONE (OUTPATIENT)
Dept: FAMILY MEDICINE CLINIC | Facility: CLINIC | Age: 28
End: 2023-12-13
Payer: MEDICARE

## 2023-12-13 LAB
25(OH)D3+25(OH)D2 SERPL-MCNC: 20 NG/ML (ref 30–100)
ALBUMIN SERPL-MCNC: 4.4 G/DL (ref 4–5)
ALBUMIN/GLOB SERPL: 1.6 {RATIO} (ref 1.2–2.2)
ALP SERPL-CCNC: 105 IU/L (ref 44–121)
ALT SERPL-CCNC: 19 IU/L (ref 0–32)
APPEARANCE UR: CLEAR
AST SERPL-CCNC: 22 IU/L (ref 0–40)
BACTERIA #/AREA URNS HPF: NORMAL /[HPF]
BILIRUB SERPL-MCNC: 0.3 MG/DL (ref 0–1.2)
BILIRUB UR QL STRIP: NEGATIVE
BUN SERPL-MCNC: 10 MG/DL (ref 6–20)
BUN/CREAT SERPL: 15 (ref 9–23)
CALCIUM SERPL-MCNC: 9.4 MG/DL (ref 8.7–10.2)
CASTS URNS QL MICRO: NORMAL /LPF
CHLORIDE SERPL-SCNC: 98 MMOL/L (ref 96–106)
CHOLEST SERPL-MCNC: 131 MG/DL (ref 100–199)
CO2 SERPL-SCNC: 26 MMOL/L (ref 20–29)
COLOR UR: YELLOW
CREAT SERPL-MCNC: 0.65 MG/DL (ref 0.57–1)
EGFRCR SERPLBLD CKD-EPI 2021: 124 ML/MIN/1.73
EPI CELLS #/AREA URNS HPF: NORMAL /HPF (ref 0–10)
GLOBULIN SER CALC-MCNC: 2.8 G/DL (ref 1.5–4.5)
GLUCOSE SERPL-MCNC: 97 MG/DL (ref 70–99)
GLUCOSE UR QL STRIP: NEGATIVE
HDLC SERPL-MCNC: 37 MG/DL
HGB UR QL STRIP: NEGATIVE
KETONES UR QL STRIP: NEGATIVE
LDLC SERPL CALC-MCNC: 61 MG/DL (ref 0–99)
LEUKOCYTE ESTERASE UR QL STRIP: NEGATIVE
MICRO URNS: ABNORMAL
MICRO URNS: ABNORMAL
MICROALBUMIN UR-MCNC: 15 UG/ML
NITRITE UR QL STRIP: NEGATIVE
PH UR STRIP: 5.5 [PH] (ref 5–7.5)
POTASSIUM SERPL-SCNC: 4.5 MMOL/L (ref 3.5–5.2)
PROT SERPL-MCNC: 7.2 G/DL (ref 6–8.5)
PROT UR QL STRIP: NEGATIVE
RBC #/AREA URNS HPF: NORMAL /HPF (ref 0–2)
SODIUM SERPL-SCNC: 140 MMOL/L (ref 134–144)
SP GR UR STRIP: >=1.03 (ref 1–1.03)
TRIGL SERPL-MCNC: 197 MG/DL (ref 0–149)
TSH SERPL DL<=0.005 MIU/L-ACNC: 1.18 UIU/ML (ref 0.45–4.5)
URINALYSIS REFLEX: ABNORMAL
UROBILINOGEN UR STRIP-MCNC: 0.2 MG/DL (ref 0.2–1)
VLDLC SERPL CALC-MCNC: 33 MG/DL (ref 5–40)
WBC #/AREA URNS HPF: NORMAL /HPF (ref 0–5)

## 2023-12-13 RX ORDER — CHOLECALCIFEROL (VITAMIN D3) 50 MCG
2000 TABLET ORAL DAILY
COMMUNITY

## 2023-12-13 NOTE — TELEPHONE ENCOUNTER
----- Message from Danny Boss MD sent at 12/13/2023 12:58 PM EST -----  Please advise patient that recent laboratory testing indicating a vitamin D deficiency for which I would recommend she start vitamin D 2000 IU once daily over-the-counter, remainder laboratory testing satisfactory including her cholesterol profile, urinalysis and urine microalbumin, chemistry profile, and thyroid testing.      I have spoke with her regarding her lab results. She has verbally said she understands the results. TF

## 2024-02-22 RX ORDER — FLUTICASONE PROPIONATE 50 MCG
SPRAY, SUSPENSION (ML) NASAL
Qty: 16 G | Refills: 2 | Status: SHIPPED | OUTPATIENT
Start: 2024-02-22

## 2024-02-29 ENCOUNTER — OFFICE VISIT (OUTPATIENT)
Dept: OBSTETRICS AND GYNECOLOGY | Facility: CLINIC | Age: 29
End: 2024-02-29
Payer: MEDICARE

## 2024-02-29 VITALS
HEIGHT: 67 IN | WEIGHT: 290 LBS | BODY MASS INDEX: 45.52 KG/M2 | DIASTOLIC BLOOD PRESSURE: 88 MMHG | RESPIRATION RATE: 18 BRPM | SYSTOLIC BLOOD PRESSURE: 116 MMHG

## 2024-02-29 DIAGNOSIS — Z80.3 FAMILY HISTORY OF BREAST CANCER: ICD-10-CM

## 2024-02-29 DIAGNOSIS — N93.9 ABNORMAL UTERINE BLEEDING (AUB): ICD-10-CM

## 2024-02-29 DIAGNOSIS — Z01.419 WOMEN'S ANNUAL ROUTINE GYNECOLOGICAL EXAMINATION: Primary | ICD-10-CM

## 2024-02-29 RX ORDER — NORETHINDRONE ACETATE AND ETHINYL ESTRADIOL AND FERROUS FUMARATE 1MG-20(24)
1 KIT ORAL DAILY
Qty: 28 TABLET | Refills: 12 | Status: SHIPPED | OUTPATIENT
Start: 2024-02-29

## 2024-02-29 NOTE — PROGRESS NOTES
Gynecologic Annual Exam Note        Gynecologic Exam  Abnormal bleeding      Subjective     HPI  Rosa Craig is a 28 y.o.  female who presents for annual well woman exam as a new patient. There were no changes to her medical or surgical history since her last visit.. Patient's last menstrual period was 09/15/2023 (within days).  Her period has been going on since 2023. Prior to this period she would have a period every 2-3 months then when she started her birth control she didn't have a period and if she did it was extremely light.  The flow is moderate to heavy. She denies dysmenorrhea. Marital Status: single.  She has never been sexually active. STD testing recommendations have been explained to the patient and she does not desire STD testing.    The patient would like to discuss the following complaints today: irregular periods. Patient has been bleeding since she started her cycle 2023. She states she does not have any cramps but she is having a constant flow. She states she is fatigued when she is on her period.    Additional OB/GYN History   contraceptive methods: None  Desires to: start contraception  Thromboembolic Disease: none  History of migraines: no  Age of menarche: 12    History of STD: no    Last Pap : 2018. Results: negative. HPV: unknown .   Last Completed Pap Smear       This patient has no relevant Health Maintenance data.             History of abnormal Pap smear: no  Gardasil status:completed  Family history of uterine, colon, breast, or ovarian cancer: yes - Mother, MA and PA had breast cancer  Performs monthly Self-Breast Exam: no  Exercises Regularly:no  Feelings of Anxiety or Depression: yes -    Tobacco Usage?: No       Current Outpatient Medications:     atorvastatin (LIPITOR) 20 MG tablet, Take 1 tablet by mouth every night at bedtime., Disp: 90 tablet, Rfl: 1    Blood Glucose Monitoring Suppl (ONE TOUCH ULTRA 2) w/Device kit, 1 Units Daily., Disp: 1 each, Rfl: 0     Cholecalciferol (Vitamin D) 50 MCG (2000 UT) tablet, Take 1 tablet by mouth Daily., Disp: , Rfl:     fluticasone (FLONASE) 50 MCG/ACT nasal spray, SHAKE LIQUID AND USE 1-2 SPRAYS IN EACH NOSTRIL EVERY DAY AS NEEDED FOR ALLERGIES, Disp: 16 g, Rfl: 2    Glucose Blood (Blood Glucose Test Strips 333) strip, 1 Units by In Vitro route Daily., Disp: 100 strip, Rfl: 3    lisinopril (PRINIVIL,ZESTRIL) 10 MG tablet, Take 1 tablet by mouth Daily., Disp: 90 tablet, Rfl: 3    loratadine (CLARITIN) 10 MG tablet, Take 1 tablet by mouth Daily., Disp: 90 tablet, Rfl: 1    norethindrone-ethinyl estradiol-ferrous fumarate (LOESTIN 24 FE) 1-20 MG-MCG(24) per tablet, Take 1 tablet by mouth Daily., Disp: 28 tablet, Rfl: 12     Would like to start an OCP today    OB History          0    Para   0    Term   0       0    AB   0    Living   0         SAB   0    IAB   0    Ectopic   0    Molar   0    Multiple   0    Live Births   0                Health Maintenance   Topic Date Due    Hepatitis B (3 of 3 - 3-dose series) 2007    Annual Gynecologic Pelvic and Breast Exam  2021    PAP SMEAR  2023    COVID-19 Vaccine ( season) 2023    DIABETIC EYE EXAM  2023    ANNUAL WELLNESS VISIT  2023    BMI FOLLOWUP  2024    HEMOGLOBIN A1C  2024    LIPID PANEL  2024    URINE MICROALBUMIN  2024    DIABETIC FOOT EXAM  2024    TDAP/TD VACCINES (2 - Td or Tdap) 2032    HEPATITIS C SCREENING  Completed    Pneumococcal Vaccine 0-64  Completed    INFLUENZA VACCINE  Completed    CHLAMYDIA SCREENING  Discontinued       Past Medical History:   Diagnosis Date    Acute pharyngitis     Allergic     Allergic rhinitis due to pollen     Anxiety     Asthma     Congenital sensorineural hearing loss     Depression     Dysthymic disorder     Elevated blood pressure reading without diagnosis of hypertension     Ganglion cyst     Generalized anxiety disorder     Microalbuminuria      "Mixed hyperlipidemia     Obesity     Onychomycosis     bilateral great toenail    Other fatigue     Other proteinuria     Polycystic ovarian syndrome     Prediabetes     Proteinuria     Tinea unguium     Viral infection     Vitamin D deficiency     Vitamin D deficiency         Past Surgical History:   Procedure Laterality Date    EXTERNAL EAR SURGERY Bilateral     WISDOM TOOTH EXTRACTION  2015       The additional following portions of the patient's history were reviewed and updated as appropriate: allergies, current medications, past family history, past medical history, past social history, past surgical history, and problem list.    Review of Systems   Constitutional: Negative.    Respiratory: Negative.     Cardiovascular: Negative.    Gastrointestinal: Negative.    Genitourinary:  Positive for menstrual problem and vaginal bleeding.         I have reviewed and agree with the HPI, ROS, and historical information as entered above. Jaqueline Eckert, APRN          Objective   /88   Resp 18   Ht 170.2 cm (67.01\")   Wt 132 kg (290 lb)   LMP 09/15/2023 (Within Days)   BMI 45.41 kg/m²     Physical Exam  Constitutional:       Appearance: Normal appearance.   Neck:      Thyroid: No thyroid mass or thyromegaly.   Pulmonary:      Effort: Pulmonary effort is normal.   Chest:      Chest wall: No mass.   Breasts:     Right: Normal. No inverted nipple, mass, nipple discharge or skin change.      Left: Normal. No inverted nipple, mass, nipple discharge or skin change.   Abdominal:      General: There is no distension.      Palpations: Abdomen is soft. There is no mass.      Tenderness: There is no abdominal tenderness.      Hernia: No hernia is present.   Genitourinary:     General: Normal vulva.      Labia:         Right: No rash.         Left: No rash.       Vagina: Normal. Bleeding present.      Cervix: No cervical motion tenderness or lesion.      Uterus: Normal.       Adnexa: Right adnexa normal and left adnexa " normal.        Right: No mass or tenderness.          Left: No mass or tenderness.     Neurological:      Mental Status: She is alert.            Assessment and Plan    Problem List Items Addressed This Visit          Family History    Family history of breast cancer    Overview     Pt mother age 45. Start screening mammo age 36          Other Visit Diagnoses       Abnormal uterine bleeding (AUB)    -  Primary    Relevant Orders    CBC (No Diff)    US Non-ob Transvaginal    Women's annual routine gynecological examination              She previously did well on TAMIKO and would like to restart that today. Teaching sheet reviewed and we discussed VTE risk today. She denies personal hx blood clot, she does have well controlled HTN.  Recent labs reviewed, TSH normal, HgBA1C 6.1    GYN annual well woman exam.   Reviewed pap guidelines.   OCP's/Vaginal Ring - Discussed side effects of nausea, BTB, headaches, breast tenderness and slight weight gain in the first three cycles.  Understands risks of blood clots, stroke, and theoretical risk of breast cancer.  Denies family history of blood clots.  Reviewed monthly self breast exams.  Instructed to call with lumps, pain, or breast discharge.    Return in about 1 week (around 3/7/2024) for U/S AUB.    Jaqueline Eckert, APRN  02/29/2024

## 2024-03-01 ENCOUNTER — PATIENT ROUNDING (BHMG ONLY) (OUTPATIENT)
Dept: OBSTETRICS AND GYNECOLOGY | Facility: CLINIC | Age: 29
End: 2024-03-01
Payer: MEDICARE

## 2024-03-01 LAB
ERYTHROCYTE [DISTWIDTH] IN BLOOD BY AUTOMATED COUNT: 14.2 % (ref 11.7–15.4)
HCT VFR BLD AUTO: 40.1 % (ref 34–46.6)
HGB BLD-MCNC: 13.4 G/DL (ref 11.1–15.9)
MCH RBC QN AUTO: 29.3 PG (ref 26.6–33)
MCHC RBC AUTO-ENTMCNC: 33.4 G/DL (ref 31.5–35.7)
MCV RBC AUTO: 88 FL (ref 79–97)
PLATELET # BLD AUTO: 351 X10E3/UL (ref 150–450)
RBC # BLD AUTO: 4.57 X10E6/UL (ref 3.77–5.28)
WBC # BLD AUTO: 12.1 X10E3/UL (ref 3.4–10.8)

## 2024-03-01 NOTE — PROGRESS NOTES
March 1, 2024    Hello, may I speak with Rosa Craig?    My name is Judy    I am  with [facility]  [facility]  [address]  [facility] [address] [phone].    Before we get started may I verify your date of birth? 1995    I am calling to officially welcome you to our practice and ask about your recent visit. Is this a good time to talk? N/a left vm    Tell me about your visit with us. What things went well?  n/a       We're always looking for ways to make our patients' experiences even better. Do you have recommendations on ways we may improve?  n/a    Overall were you satisfied with your first visit to our practice? N/a       I appreciate you taking the time to speak with me today. Is there anything else I can do for you? N/a      Thank you, and have a great day.

## 2024-03-07 ENCOUNTER — TELEPHONE (OUTPATIENT)
Dept: OBSTETRICS AND GYNECOLOGY | Facility: CLINIC | Age: 29
End: 2024-03-07
Payer: MEDICARE

## 2024-03-07 RX ORDER — ERGOCALCIFEROL 1.25 MG/1
50000 CAPSULE ORAL
Qty: 5 CAPSULE | Refills: 3 | OUTPATIENT
Start: 2024-03-07

## 2024-03-07 NOTE — TELEPHONE ENCOUNTER
[employer] sent SAMANTHA Eckert a letter that Junel 24 is not on her formulary. She was given a temp fill of this medication. No names of alternative drugs given - it said there was a formulary book. Msg sent to her provider.

## 2024-03-11 LAB — REF LAB TEST METHOD: NORMAL

## 2024-03-11 RX ORDER — NORGESTIMATE AND ETHINYL ESTRADIOL 0.25-0.035
1 KIT ORAL DAILY
Qty: 28 TABLET | Refills: 12 | Status: SHIPPED | OUTPATIENT
Start: 2024-03-11

## 2024-03-11 NOTE — TELEPHONE ENCOUNTER
I tried to call the pt, the phone picked up and them hung up. Jaqueline Eckert sent in a different OCP Sprintec.

## 2024-03-14 ENCOUNTER — OFFICE VISIT (OUTPATIENT)
Dept: OBSTETRICS AND GYNECOLOGY | Facility: CLINIC | Age: 29
End: 2024-03-14
Payer: MEDICARE

## 2024-03-14 VITALS
SYSTOLIC BLOOD PRESSURE: 112 MMHG | DIASTOLIC BLOOD PRESSURE: 72 MMHG | WEIGHT: 291 LBS | BODY MASS INDEX: 45.67 KG/M2 | HEIGHT: 67 IN

## 2024-03-14 DIAGNOSIS — N93.9 ABNORMAL UTERINE BLEEDING (AUB): Primary | ICD-10-CM

## 2024-03-14 DIAGNOSIS — E28.2 POLYCYSTIC OVARIAN SYNDROME: ICD-10-CM

## 2024-03-14 PROCEDURE — 3074F SYST BP LT 130 MM HG: CPT | Performed by: NURSE PRACTITIONER

## 2024-03-14 PROCEDURE — 3078F DIAST BP <80 MM HG: CPT | Performed by: NURSE PRACTITIONER

## 2024-03-14 PROCEDURE — 99213 OFFICE O/P EST LOW 20 MIN: CPT | Performed by: NURSE PRACTITIONER

## 2024-03-14 NOTE — PROGRESS NOTES
Chief Complaint   Patient presents with    F/U AUB       Subjective   HPI  Rosa Craig is a 28 y.o. female, . Her last LMP was Patient's last menstrual period was 09/15/2023 (within days).. who presents for follow up on abnormal uterine bleeding.      Patient last seen 24 for her annual exam with c/o AUB.  At that time, she c/o bleeding since she started her cycle 2023. She states she does not have any cramps but she is having a constant flow. She states she is fatigued when she is on her period.  Patient had stated that she previously tolerated TAMIKO well, and desired to restart.  Was given ortho-cyclen.      Today, patient returned for ultrasound.  She reports that she is still bleeding, but is light in flow.   She denies cramping.       Additional OB/GYN History     Last Pap : 2024- non diagnostic, HPV negative   Last Completed Pap Smear            PAP SMEAR (Every 3 Years) Next due on 2024  LIQUID-BASED PAP SMEAR WITH HPV GENOTYPING REGARDLESS OF INTERPRETATION ([facility])    2020  Patient-Reported (Performed Externally) - Normal reported by patient, Dr. Olsen    2020  Patient-Reported (Performed Externally) - Patient report normal Pap smear per GYN                      Tobacco Usage?: No   OB History          0    Para   0    Term   0       0    AB   0    Living   0         SAB   0    IAB   0    Ectopic   0    Molar   0    Multiple   0    Live Births   0                  Current Outpatient Medications:     atorvastatin (LIPITOR) 20 MG tablet, Take 1 tablet by mouth every night at bedtime., Disp: 90 tablet, Rfl: 1    Blood Glucose Monitoring Suppl (ONE TOUCH ULTRA 2) w/Device kit, 1 Units Daily., Disp: 1 each, Rfl: 0    Cholecalciferol (Vitamin D) 50 MCG (2000) tablet, Take 1 tablet by mouth Daily., Disp: , Rfl:     fluticasone (FLONASE) 50 MCG/ACT nasal spray, SHAKE LIQUID AND USE 1-2 SPRAYS IN EACH NOSTRIL EVERY DAY AS NEEDED  "FOR ALLERGIES, Disp: 16 g, Rfl: 2    Glucose Blood (Blood Glucose Test Strips 333) strip, 1 Units by In Vitro route Daily., Disp: 100 strip, Rfl: 3    lisinopril (PRINIVIL,ZESTRIL) 10 MG tablet, Take 1 tablet by mouth Daily., Disp: 90 tablet, Rfl: 3    loratadine (CLARITIN) 10 MG tablet, Take 1 tablet by mouth Daily., Disp: 90 tablet, Rfl: 1    norgestimate-ethinyl estradiol (ORTHO-CYCLEN) 0.25-35 MG-MCG per tablet, Take 1 tablet by mouth Daily., Disp: 28 tablet, Rfl: 12     Past Medical History:   Diagnosis Date    Acute pharyngitis     Allergic     Allergic rhinitis due to pollen     Anxiety     Asthma     Congenital sensorineural hearing loss     Depression     Dysthymic disorder     Elevated blood pressure reading without diagnosis of hypertension     Ganglion cyst     Generalized anxiety disorder     Microalbuminuria     Mixed hyperlipidemia     Obesity     Onychomycosis     bilateral great toenail    Other fatigue     Other proteinuria     Polycystic ovarian syndrome     Prediabetes     Proteinuria     Tinea unguium     Viral infection     Vitamin D deficiency     Vitamin D deficiency         Past Surgical History:   Procedure Laterality Date    EXTERNAL EAR SURGERY Bilateral     WISDOM TOOTH EXTRACTION  2015       The additional following portions of the patient's history were reviewed and updated as appropriate: allergies, current medications, past family history, past medical history, past social history, past surgical history, and problem list.    Review of Systems   Constitutional: Negative.    Respiratory: Negative.     Cardiovascular: Negative.    Gastrointestinal: Negative.    Genitourinary:  Positive for menstrual problem.       I have reviewed and agree with the HPI, ROS, and historical information as entered above. Jaqueline Eckert, APRN      Objective   /72   Ht 170.2 cm (67\")   Wt 132 kg (291 lb)   LMP 09/15/2023 (Within Days)   BMI 45.58 kg/m²     Physical Exam  Constitutional:       " Appearance: Normal appearance.   Pulmonary:      Effort: Pulmonary effort is normal.   Neurological:      Mental Status: She is alert.         Assessment & Plan     Assessment     Problem List Items Addressed This Visit          Endocrine and Metabolic    Polycystic ovarian syndrome       Genitourinary and Reproductive     Abnormal uterine bleeding (AUB) - Primary    Overview     AUB with know PCOS. Previously did well on TAMIKO and wanted to restart that. U/S at F/U endometrium- 10 mm, bilateral follicles. Bleeding has lightened since starting OCP. Pap non diagnostic due to blood, HPV negative, she has never been SA, plan repeat next year. Discussed give 3 mos and call for bleeding > 1 pad per hour or if AUB persists.               Plan     Continue OCP (initially did loestrin 1/20 which she is currently still on, not covered so changed to sprintec which she will do next pill pack)  Return in about 3 months (around 6/14/2024) for TAMIKO F/U.        Jaqueline Eckert, APRN  03/14/2024

## 2024-05-20 RX ORDER — FLUTICASONE PROPIONATE 50 MCG
SPRAY, SUSPENSION (ML) NASAL
Qty: 16 G | Refills: 2 | Status: SHIPPED | OUTPATIENT
Start: 2024-05-20

## 2024-06-03 DIAGNOSIS — I10 PRIMARY HYPERTENSION: ICD-10-CM

## 2024-06-03 RX ORDER — LISINOPRIL 10 MG/1
10 TABLET ORAL DAILY
Qty: 90 TABLET | Refills: 3 | Status: SHIPPED | OUTPATIENT
Start: 2024-06-03

## 2024-06-12 ENCOUNTER — OFFICE VISIT (OUTPATIENT)
Dept: FAMILY MEDICINE CLINIC | Facility: CLINIC | Age: 29
End: 2024-06-12
Payer: MEDICARE

## 2024-06-12 VITALS
WEIGHT: 293 LBS | RESPIRATION RATE: 18 BRPM | SYSTOLIC BLOOD PRESSURE: 124 MMHG | HEART RATE: 68 BPM | HEIGHT: 67 IN | TEMPERATURE: 98.3 F | BODY MASS INDEX: 45.99 KG/M2 | OXYGEN SATURATION: 98 % | DIASTOLIC BLOOD PRESSURE: 76 MMHG

## 2024-06-12 DIAGNOSIS — I10 PRIMARY HYPERTENSION: ICD-10-CM

## 2024-06-12 DIAGNOSIS — E66.01 CLASS 3 SEVERE OBESITY DUE TO EXCESS CALORIES WITHOUT SERIOUS COMORBIDITY WITH BODY MASS INDEX (BMI) OF 45.0 TO 49.9 IN ADULT: ICD-10-CM

## 2024-06-12 DIAGNOSIS — E78.2 MIXED HYPERLIPIDEMIA: ICD-10-CM

## 2024-06-12 DIAGNOSIS — E28.2 POLYCYSTIC OVARIAN SYNDROME: ICD-10-CM

## 2024-06-12 DIAGNOSIS — Z00.01 ENCOUNTER FOR GENERAL ADULT MEDICAL EXAMINATION WITH ABNORMAL FINDINGS: Primary | ICD-10-CM

## 2024-06-12 DIAGNOSIS — E11.9 TYPE 2 DIABETES MELLITUS WITHOUT COMPLICATION, WITHOUT LONG-TERM CURRENT USE OF INSULIN: ICD-10-CM

## 2024-06-12 DIAGNOSIS — E55.9 VITAMIN D DEFICIENCY: ICD-10-CM

## 2024-06-12 DIAGNOSIS — H90.5 CONGENITAL DEAFNESS: ICD-10-CM

## 2024-06-12 LAB
EXPIRATION DATE: ABNORMAL
EXPIRATION DATE: NORMAL
GLUCOSE BLDC GLUCOMTR-MCNC: 117 MG/DL (ref 70–130)
HBA1C MFR BLD: 6.3 % (ref 4.5–5.7)
Lab: ABNORMAL
Lab: NORMAL

## 2024-06-12 PROCEDURE — 3074F SYST BP LT 130 MM HG: CPT | Performed by: INTERNAL MEDICINE

## 2024-06-12 PROCEDURE — 1159F MED LIST DOCD IN RCRD: CPT | Performed by: INTERNAL MEDICINE

## 2024-06-12 PROCEDURE — 1160F RVW MEDS BY RX/DR IN RCRD: CPT | Performed by: INTERNAL MEDICINE

## 2024-06-12 PROCEDURE — G0439 PPPS, SUBSEQ VISIT: HCPCS | Performed by: INTERNAL MEDICINE

## 2024-06-12 PROCEDURE — 83036 HEMOGLOBIN GLYCOSYLATED A1C: CPT | Performed by: INTERNAL MEDICINE

## 2024-06-12 PROCEDURE — 1170F FXNL STATUS ASSESSED: CPT | Performed by: INTERNAL MEDICINE

## 2024-06-12 PROCEDURE — 3044F HG A1C LEVEL LT 7.0%: CPT | Performed by: INTERNAL MEDICINE

## 2024-06-12 PROCEDURE — 99214 OFFICE O/P EST MOD 30 MIN: CPT | Performed by: INTERNAL MEDICINE

## 2024-06-12 PROCEDURE — 3078F DIAST BP <80 MM HG: CPT | Performed by: INTERNAL MEDICINE

## 2024-06-12 PROCEDURE — 82947 ASSAY GLUCOSE BLOOD QUANT: CPT | Performed by: INTERNAL MEDICINE

## 2024-06-12 NOTE — ASSESSMENT & PLAN NOTE
Followed regularly by gynecology taking OCP.  Follow-up visit tomorrow.  Has had menstrual regulation of her abnormal uterine bleeding.

## 2024-06-12 NOTE — ASSESSMENT & PLAN NOTE
Checks blood glucose weekly at home averaging 90s, hemoglobin A1c today 6.3%, slightly increased from last value 6.1% in 12/2023.  No current diabetic meds.  Emphasized need to improve her diet with regular exercise.  Does take ACE inhibitor.  Indicates she is up-to-date with diabetic eye evaluation.  No known secondary sequelae.

## 2024-06-12 NOTE — ASSESSMENT & PLAN NOTE
28-year-old female with health issues being addressed as detailed below, health maintenance includes Pap smear and HPV screen current from 12/2024 followed by GYN, all work recommended vaccinations up-to-date, full send laboratory testing obtained in 12/2023 to be updated again in 6 months, hemoglobin A1c today 6.3% increased from 6.1% 6 months prior.

## 2024-06-12 NOTE — PROGRESS NOTES
The ABCs of the Annual Wellness Visit  Subsequent Medicare Wellness Visit    Nurys Craig is a 28 y.o. female who presents for a Subsequent Medicare Wellness Visit.    The following portions of the patient's history were reviewed and   updated as appropriate: allergies, current medications, past family history, past medical history, past social history, past surgical history, and problem list.    Compared to one year ago, the patient feels her physical   health is the same.    Compared to one year ago, the patient feels her mental   health is the same.    Recent Hospitalizations:  She was not admitted to the hospital during the last year.       Current Medical Providers:  Patient Care Team:  Danny Boss MD as PCP - General (Internal Medicine)    Outpatient Medications Prior to Visit   Medication Sig Dispense Refill    atorvastatin (LIPITOR) 20 MG tablet Take 1 tablet by mouth every night at bedtime. 90 tablet 1    Blood Glucose Monitoring Suppl (ONE TOUCH ULTRA 2) w/Device kit 1 Units Daily. 1 each 0    Cholecalciferol (Vitamin D) 50 MCG (2000 UT) tablet Take 1 tablet by mouth Daily.      fluticasone (FLONASE) 50 MCG/ACT nasal spray SHAKE LIQUID AND USE 1 TO 2 SPRAYS IN EACH NOSTRIL EVERY DAY AS NEEDED FOR ALLERGIES 16 g 2    Glucose Blood (Blood Glucose Test Strips 333) strip 1 Units by In Vitro route Daily. 100 strip 3    lisinopril (PRINIVIL,ZESTRIL) 10 MG tablet TAKE 1 TABLET BY MOUTH DAILY 90 tablet 3    loratadine (CLARITIN) 10 MG tablet Take 1 tablet by mouth Daily. 90 tablet 1    norgestimate-ethinyl estradiol (ORTHO-CYCLEN) 0.25-35 MG-MCG per tablet Take 1 tablet by mouth Daily. 28 tablet 12     No facility-administered medications prior to visit.       No opioid medication identified on active medication list. I have reviewed chart for other potential  high risk medication/s and harmful drug interactions in the elderly.        Aspirin is not on active medication list.  Aspirin use is  "not indicated based on review of current medical condition/s. Risk of harm outweighs potential benefits.  .    Patient Active Problem List   Diagnosis    Allergic rhinitis due to pollen    Asthma    Mixed hyperlipidemia    Obesity    Polycystic ovarian syndrome    Vitamin D deficiency    Anxiety    Depression    Type 2 diabetes mellitus without complication, without long-term current use of insulin    Primary hypertension    Viral syndrome    Impacted cerumen of right ear    Otitis externa    Encounter for general adult medical examination with abnormal findings    Influenza vaccination administered at current visit    Screening for thyroid disorder    Congenital deafness    Family history of breast cancer    Abnormal uterine bleeding (AUB)     Advance Care Planning   Advance Care Planning     Advance Directive is not on file.  ACP discussion was held with the patient during this visit. Patient does not have an advance directive, information provided.     Objective    Vitals:    06/12/24 1004   BP: 124/76   BP Location: Left arm   Patient Position: Sitting   Cuff Size: Adult   Pulse: 68   Resp: 18   Temp: 98.3 °F (36.8 °C)   TempSrc: Temporal   SpO2: 98%   Weight: 133 kg (294 lb)   Height: 170.2 cm (67\")     Estimated body mass index is 46.05 kg/m² as calculated from the following:    Height as of this encounter: 170.2 cm (67\").    Weight as of this encounter: 133 kg (294 lb).    Class 3 Severe Obesity (BMI >=40). Obesity-related health conditions include the following: hypertension, diabetes mellitus, and dyslipidemias. Obesity is unchanged. BMI is is above average; BMI management plan is completed. We discussed portion control, increasing exercise, and weight loss medication options .      Does the patient have evidence of cognitive impairment? No    Lab Results   Component Value Date    HGBA1C 6.3 (A) 06/12/2024        HEALTH RISK ASSESSMENT    Smoking Status:  Social History     Tobacco Use   Smoking Status " Never   Smokeless Tobacco Never     Alcohol Consumption:  Social History     Substance and Sexual Activity   Alcohol Use Never     Fall Risk Screen:    RAHULADI Fall Risk Assessment was completed, and patient is at LOW risk for falls.Assessment completed on:2024    Depression Screenin/12/2024    10:07 AM   PHQ-2/PHQ-9 Depression Screening   Little Interest or Pleasure in Doing Things 0-->not at all   Feeling Down, Depressed or Hopeless 0-->not at all   PHQ-9: Brief Depression Severity Measure Score 0       Health Habits and Functional and Cognitive Screenin/12/2024    10:05 AM   Functional & Cognitive Status   Do you have difficulty preparing food and eating? No   Do you have difficulty bathing yourself, getting dressed or grooming yourself? No   Do you have difficulty using the toilet? No   Do you have difficulty moving around from place to place? No   Do you have trouble with steps or getting out of a bed or a chair? No   Current Diet Other   Dental Exam Up to date   Eye Exam Up to date   Exercise (times per week) 1 times per week   Current Exercises Include Walking   Do you need help using the phone?  No   Are you deaf or do you have serious difficulty hearing?  No   Do you need help to go to places out of walking distance? No   Do you need help shopping? No   Do you need help preparing meals?  No   Do you need help with housework?  No   Do you need help with laundry? No   Do you need help taking your medications? No   Do you need help managing money? No   Do you ever drive or ride in a car without wearing a seat belt? No   Have you felt unusual stress, anger or loneliness in the last month? No   Who do you live with? Other   If you need help, do you have trouble finding someone available to you? No   Have you been bothered in the last four weeks by sexual problems? No   Do you have difficulty concentrating, remembering or making decisions? No       Age-appropriate Screening Schedule:  Refer  to the list below for future screening recommendations based on patient's age, sex and/or medical conditions. Orders for these recommended tests are listed in the plan section. The patient has been provided with a written plan.    Health Maintenance   Topic Date Due    ANNUAL WELLNESS VISIT  12/09/2023    BMI FOLLOWUP  06/09/2024    COVID-19 Vaccine (4 - 2023-24 season) 06/14/2024 (Originally 9/1/2023)    Hepatitis B (3 of 3 - 3-dose series) 06/12/2025 (Originally 11/9/2007)    INFLUENZA VACCINE  08/01/2024    DIABETIC EYE EXAM  11/23/2024    DIABETIC FOOT EXAM  12/12/2024    LIPID PANEL  12/12/2024    HEMOGLOBIN A1C  12/12/2024    URINE MICROALBUMIN  12/12/2024    PAP SMEAR  02/28/2027    TDAP/TD VACCINES (2 - Td or Tdap) 12/09/2032    HEPATITIS C SCREENING  Completed    Pneumococcal Vaccine 0-64  Completed    CHLAMYDIA SCREENING  Discontinued                  CMS Preventative Services Quick Reference  Risk Factors Identified During Encounter  Hearing Problem:  Wears hearing aids  Dental Screening Recommended  Vision Screening Recommended  The above risks/problems have been discussed with the patient.  Pertinent information has been shared with the patient in the After Visit Summary.  An After Visit Summary and PPPS were made available to the patient.    Follow Up:   Next Medicare Wellness visit to be scheduled in 1 year.       Additional E&M Note during same encounter follows:  Patient has multiple medical problems which are significant and separately identifiable that require additional work above and beyond the Medicare Wellness Visit.      Chief Complaint  Medicare Wellness-subsequent    Subjective        HPI  Rosa Craig is also being seen today for health review.  Last visit in 12/2023 was not billed as Medicare wellness visit thus is being repeated today.  She is a diabetic type II previously having been on metformin stopped secondary to GI intolerance, checking her blood sugars weekly averaging in the 90s  "on no related meds, hemoglobin A1c 6.1% in 12/2023, increase slightly to 6.3% today, up-to-date by history with diabetic eye evaluation, also polycystic ovary syndrome with abnormal uterine bleeding followed by gynecology with next visit tomorrow, having been switched from Junel over to Sprintec having regulation of her menses with no significant discomfort.  Pap smear and HPV screen from 2/2024 with a Pap nondiagnostic secondary to bleeding and HPV negative plan to repeat in 1 year.  Hypertension taking lisinopril 10 mg daily, not checking blood sugars, denying chest pains palpitations dyspnea dizziness or edema, hyperlipidemia on Lipitor 20 mg daily, congenital deafness with hearing aids, vitamin D deficiency on supplement, obesity with no real attempts at lifestyle modification with sedentary lifestyle, rare fruit or vegetable, weight increased 3 pounds in the last 3 months.  States she sleeps a lot because she is bored.  Previous anxiety and depression not currently problematic.  Really no other concerns, ROS otherwise unremarkable.  Health maintenance includes Pap smear nondiagnostic from 2/2024 with HPV screen negative, repeat in 1 year through gynecology, full laboratory testing obtained in 12/2023 with hemoglobin A1c today 6.3% increase slightly from 6.1% in 12/2023, all recommended vaccinations up-to-date.         Objective   Vital Signs:  /76 (BP Location: Left arm, Patient Position: Sitting, Cuff Size: Adult)   Pulse 68   Temp 98.3 °F (36.8 °C) (Temporal)   Resp 18   Ht 170.2 cm (67\")   Wt 133 kg (294 lb)   SpO2 98%   BMI 46.05 kg/m²     Physical Exam     Vitals and nursing note reviewed.   Constitutional:       Appearance: Normal appearance. She is obese.      Comments: Pleasant, healthy, NAD, hard of hearing but converses well with hearing aids, BMI 46.0 reflecting a 3 pound weight gain in the last 3 months   HENT:      Head: Normocephalic and atraumatic.      Right Ear: Tympanic membrane, " ear canal and external ear normal.      Left Ear: Tympanic membrane, ear canal and external ear normal.      Nose: Nose normal.      Mouth/Throat:      Mouth: Mucous membranes are moist.      Pharynx: Oropharynx is clear.      Comments: Good dentition  Eyes:      Extraocular Movements: Extraocular movements intact.      Conjunctiva/sclera: Conjunctivae normal.      Pupils: Pupils are equal, round, and reactive to light.   Neck:      Vascular: No carotid bruit.      Comments: No cervical periclavicular or inguinal adenopathy  Cardiovascular:      Rate and Rhythm: Normal rate and regular rhythm.      Pulses: Normal pulses.      Heart sounds: Normal heart sounds. No murmur heard.     No friction rub. No gallop.      Comments: 2+ peripheral pulses, no dependent edema  Pulmonary:      Effort: Pulmonary effort is normal. No respiratory distress.      Breath sounds: Normal breath sounds.      Comments: No cough wheeze or dyspnea  Abdominal:      General: Bowel sounds are normal.      Palpations: Abdomen is soft.      Comments: Centripetal obesity, nontender nondistended with no organomegaly or masses   Genitourinary:     Comments: Breast pelvic and rectal exams deferred as routinely followed up with gynecology  Musculoskeletal:         General: No swelling, tenderness or deformity. Normal range of motion.      Cervical back: Normal range of motion and neck supple. No rigidity or tenderness.      Right lower leg: No edema.      Left lower leg: No edema.   Lymphadenopathy:      Cervical: No cervical adenopathy.      Upper Body:      Right upper body: No supraclavicular adenopathy.      Left upper body: No supraclavicular adenopathy.   Skin:     General: Skin is warm and dry.      Capillary Refill: Capillary refill takes less than 2 seconds.      Findings: No lesion or rash.   Neurological:      General: No focal deficit present.      Mental Status: She is alert and oriented to person, place, and time. Mental status is at  baseline.      Cranial Nerves: No cranial nerve deficit.      Sensory: No sensory deficit.      Motor: No weakness.      Coordination: Coordination normal.      Comments: Bipedal exam with 2+ pulses, normal sensation in both feet to fine touch vibration and pinprick, motor exam normal, no skin breakdown, no edema   Psychiatric:         Mood and Affect: Mood normal.         Behavior: Behavior normal.         Thought Content: Thought content normal.         Judgment: Judgment normal.     Diabetic Foot Exam Performed and Monofilament Test Performed     Review of EKG from 5/12/2023 reveals sinus rhythm rate 75 with an incomplete RBBB, no ischemia, no change versus previous EKG       Assessment and Plan   Diagnoses and all orders for this visit:    1. Encounter for general adult medical examination with abnormal findings (Primary)  Assessment & Plan:  28-year-old female with health issues being addressed as detailed below, health maintenance includes Pap smear and HPV screen current from 12/2024 followed by GYN, all work recommended vaccinations up-to-date, full send laboratory testing obtained in 12/2023 to be updated again in 6 months, hemoglobin A1c today 6.3% increased from 6.1% 6 months prior.      2. Type 2 diabetes mellitus without complication, without long-term current use of insulin  Assessment & Plan:  Checks blood glucose weekly at home averaging 90s, hemoglobin A1c today 6.3%, slightly increased from last value 6.1% in 12/2023.  No current diabetic meds.  Emphasized need to improve her diet with regular exercise.  Does take ACE inhibitor.  Indicates she is up-to-date with diabetic eye evaluation.  No known secondary sequelae.    Orders:  -     POC Glycosylated Hemoglobin (Hb A1C)  -     POC Glucose, Blood    3. Primary hypertension  Assessment & Plan:  Satisfactory blood pressure control acutely, chronic control unknown taking lisinopril 10 mg daily.  Advised to start monitoring blood pressures ideally at  least every week to every other week with ideal parameters discussed.  Pursue improvement in her healthy lifestyle efforts.      4. Mixed hyperlipidemia  Assessment & Plan:  Taking atorvastatin 20 mg nightly with satisfactory lipid profile from 12/2023.  Emphasized need to improve her lifestyle with regular exercise and appropriate diet.  Repeat lipids in 12/2024.      5. Vitamin D deficiency  Assessment & Plan:  Taking vitamin D supplementation at 2000 IU daily.  Update testing in 6 months      6. Polycystic ovarian syndrome  Assessment & Plan:  Followed regularly by gynecology taking OCP.  Follow-up visit tomorrow.  Has had menstrual regulation of her abnormal uterine bleeding.      7. Class 3 severe obesity due to excess calories without serious comorbidity with body mass index (BMI) of 45.0 to 49.9 in adult  Assessment & Plan:  Unfortunately continues progressive weight gain despite having been counseled multiple times regarding need to improve her lifestyle with diet and exercise.  She has secondary comorbid conditions including diabetes mellitus type 2, hypertension, dyslipidemia and polycystic ovary syndrome.  She indicates she will start exercising regularly, with a goal to lose 30 pounds of weight between now and her follow-up visit in 6 months.  We did discuss weight loss medication options including most affordable being phentermine but she declines at this time.  She will advise me if she has a change of heart and would like to initiate medication assistance for weight loss.      8. Congenital deafness  Assessment & Plan:  Wears hearing aids with satisfactory clinical results.               Follow Up   Return in about 6 months (around 12/12/2024) for Recheck.  Patient was given instructions and counseling regarding her condition or for health maintenance advice. Please see specific information pulled into the AVS if appropriate.

## 2024-06-12 NOTE — ASSESSMENT & PLAN NOTE
Satisfactory blood pressure control acutely, chronic control unknown taking lisinopril 10 mg daily.  Advised to start monitoring blood pressures ideally at least every week to every other week with ideal parameters discussed.  Pursue improvement in her healthy lifestyle efforts.

## 2024-06-12 NOTE — ASSESSMENT & PLAN NOTE
Unfortunately continues progressive weight gain despite having been counseled multiple times regarding need to improve her lifestyle with diet and exercise.  She has secondary comorbid conditions including diabetes mellitus type 2, hypertension, dyslipidemia and polycystic ovary syndrome.  She indicates she will start exercising regularly, with a goal to lose 30 pounds of weight between now and her follow-up visit in 6 months.  We did discuss weight loss medication options including most affordable being phentermine but she declines at this time.  She will advise me if she has a change of heart and would like to initiate medication assistance for weight loss.

## 2024-06-12 NOTE — ASSESSMENT & PLAN NOTE
Taking atorvastatin 20 mg nightly with satisfactory lipid profile from 12/2023.  Emphasized need to improve her lifestyle with regular exercise and appropriate diet.  Repeat lipids in 12/2024.

## 2024-06-12 NOTE — PROGRESS NOTES
The ABCs of the Annual Wellness Visit  Subsequent Medicare Wellness Visit    Nurys Craig is a 28 y.o. female who presents for a Subsequent Medicare Wellness Visit.    The following portions of the patient's history were reviewed and   updated as appropriate: allergies, current medications, past family history, past medical history, past social history, past surgical history, and problem list.    Compared to one year ago, the patient feels her physical   health is the same.    Compared to one year ago, the patient feels her mental   health is the same.    Recent Hospitalizations:  She was not admitted to the hospital during the last year.       Current Medical Providers:  Patient Care Team:  Danny Boss MD as PCP - General (Internal Medicine)    Outpatient Medications Prior to Visit   Medication Sig Dispense Refill    atorvastatin (LIPITOR) 20 MG tablet Take 1 tablet by mouth every night at bedtime. 90 tablet 1    Blood Glucose Monitoring Suppl (ONE TOUCH ULTRA 2) w/Device kit 1 Units Daily. 1 each 0    Cholecalciferol (Vitamin D) 50 MCG (2000 UT) tablet Take 1 tablet by mouth Daily.      fluticasone (FLONASE) 50 MCG/ACT nasal spray SHAKE LIQUID AND USE 1 TO 2 SPRAYS IN EACH NOSTRIL EVERY DAY AS NEEDED FOR ALLERGIES 16 g 2    Glucose Blood (Blood Glucose Test Strips 333) strip 1 Units by In Vitro route Daily. 100 strip 3    lisinopril (PRINIVIL,ZESTRIL) 10 MG tablet TAKE 1 TABLET BY MOUTH DAILY 90 tablet 3    loratadine (CLARITIN) 10 MG tablet Take 1 tablet by mouth Daily. 90 tablet 1    norgestimate-ethinyl estradiol (ORTHO-CYCLEN) 0.25-35 MG-MCG per tablet Take 1 tablet by mouth Daily. 28 tablet 12     No facility-administered medications prior to visit.       No opioid medication identified on active medication list. I have reviewed chart for other potential  high risk medication/s and harmful drug interactions in the elderly.        Aspirin is not on active medication list.  Aspirin use is  "not indicated based on review of current medical condition/s. Risk of harm outweighs potential benefits.  .    Patient Active Problem List   Diagnosis    Allergic rhinitis due to pollen    Asthma    Mixed hyperlipidemia    Obesity    Polycystic ovarian syndrome    Vitamin D deficiency    Anxiety    Depression    Type 2 diabetes mellitus without complication, without long-term current use of insulin    Primary hypertension    Viral syndrome    Impacted cerumen of right ear    Otitis externa    Encounter for general adult medical examination with abnormal findings    Influenza vaccination administered at current visit    Screening for thyroid disorder    Congenital deafness    Family history of breast cancer    Abnormal uterine bleeding (AUB)     Advance Care Planning   Advance Care Planning     Advance Directive is not on file.  ACP discussion was held with the patient during this visit. Patient does not have an advance directive, information provided.     Objective    Vitals:    06/12/24 1004   BP: 124/76   BP Location: Left arm   Patient Position: Sitting   Cuff Size: Adult   Pulse: 68   Resp: 18   Temp: 98.3 °F (36.8 °C)   TempSrc: Temporal   SpO2: 98%   Weight: 133 kg (294 lb)   Height: 170.2 cm (67\")     Estimated body mass index is 46.05 kg/m² as calculated from the following:    Height as of this encounter: 170.2 cm (67\").    Weight as of this encounter: 133 kg (294 lb).    Class 3 Severe Obesity (BMI >=40). Obesity-related health conditions include the following: hypertension and dyslipidemias. Obesity is unchanged. BMI is is above average; BMI management plan is completed. We discussed portion control and increasing exercise.      Does the patient have evidence of cognitive impairment? No    Lab Results   Component Value Date    HGBA1C 6.3 (A) 06/12/2024        HEALTH RISK ASSESSMENT    Smoking Status:  Social History     Tobacco Use   Smoking Status Never   Smokeless Tobacco Never     Alcohol " Consumption:  Social History     Substance and Sexual Activity   Alcohol Use Never     Fall Risk Screen:    RAHULADI Fall Risk Assessment was completed, and patient is at LOW risk for falls.Assessment completed on:2024    Depression Screenin/12/2024    10:07 AM   PHQ-2/PHQ-9 Depression Screening   Little Interest or Pleasure in Doing Things 0-->not at all   Feeling Down, Depressed or Hopeless 0-->not at all   PHQ-9: Brief Depression Severity Measure Score 0       Health Habits and Functional and Cognitive Screenin/12/2024    10:05 AM   Functional & Cognitive Status   Do you have difficulty preparing food and eating? No   Do you have difficulty bathing yourself, getting dressed or grooming yourself? No   Do you have difficulty using the toilet? No   Do you have difficulty moving around from place to place? No   Do you have trouble with steps or getting out of a bed or a chair? No   Current Diet Other   Dental Exam Up to date   Eye Exam Up to date   Exercise (times per week) 1 times per week   Current Exercises Include Walking   Do you need help using the phone?  No   Are you deaf or do you have serious difficulty hearing?  No   Do you need help to go to places out of walking distance? No   Do you need help shopping? No   Do you need help preparing meals?  No   Do you need help with housework?  No   Do you need help with laundry? No   Do you need help taking your medications? No   Do you need help managing money? No   Do you ever drive or ride in a car without wearing a seat belt? No   Have you felt unusual stress, anger or loneliness in the last month? No   Who do you live with? Other   If you need help, do you have trouble finding someone available to you? No   Have you been bothered in the last four weeks by sexual problems? No   Do you have difficulty concentrating, remembering or making decisions? No       Age-appropriate Screening Schedule:  Refer to the list below for future screening  recommendations based on patient's age, sex and/or medical conditions. Orders for these recommended tests are listed in the plan section. The patient has been provided with a written plan.    Health Maintenance   Topic Date Due    ANNUAL WELLNESS VISIT  12/09/2023    BMI FOLLOWUP  06/09/2024    COVID-19 Vaccine (4 - 2023-24 season) 06/14/2024 (Originally 9/1/2023)    Hepatitis B (3 of 3 - 3-dose series) 06/12/2025 (Originally 11/9/2007)    INFLUENZA VACCINE  08/01/2024    DIABETIC EYE EXAM  11/23/2024    DIABETIC FOOT EXAM  12/12/2024    LIPID PANEL  12/12/2024    HEMOGLOBIN A1C  12/12/2024    URINE MICROALBUMIN  12/12/2024    PAP SMEAR  02/28/2027    TDAP/TD VACCINES (2 - Td or Tdap) 12/09/2032    HEPATITIS C SCREENING  Completed    Pneumococcal Vaccine 0-64  Completed    CHLAMYDIA SCREENING  Discontinued                  CMS Preventative Services Quick Reference  Risk Factors Identified During Encounter  Hearing Problem:  wears hearing aides  Dental Screening Recommended  The above risks/problems have been discussed with the patient.  Pertinent information has been shared with the patient in the After Visit Summary.  An After Visit Summary and PPPS were made available to the patient.    Follow Up:   Next Medicare Wellness visit to be scheduled in 1 year.   {Wrapup  Review (Popup)  Advance Care Planning  Labs  CC  Problem List  Visit Diagnosis  Medications  Result Review  Imaging  [facility] Maintenance  Quality  BestPractice  SmartSets  SnapShot  Encounters  Notes  Media  Procedures :23}    Additional E&M Note during same encounter follows:  Patient has multiple medical problems which are significant and separately identifiable that require additional work above and beyond the Medicare Wellness Visit.      Chief Complaint  Medicare Wellness-subsequent    Subjective    {Problem List  Visit Diagnosis   Encounters  Notes  Medications  Labs  Result Review Imaging  Media :23}    [facility]  Rosa Craig  "is also being seen today for ***         Objective   Vital Signs:  /76 (BP Location: Left arm, Patient Position: Sitting, Cuff Size: Adult)   Pulse 68   Temp 98.3 °F (36.8 °C) (Temporal)   Resp 18   Ht 170.2 cm (67\")   Wt 133 kg (294 lb)   SpO2 98%   BMI 46.05 kg/m²     Physical Exam     {The following data was reviewed by (Optional):40074}  {Ambulatory Labs (Optional):46858}  {Data reviewed (Optional):55842:::1}           Assessment and Plan {CC Problem List  Visit Diagnosis   ROS  Review (Popup)  Health Maintenance  Quality  BestPractice  Medications  SmartSets  SnapShot Encounters  Media :23}  Diagnoses and all orders for this visit:    1. Encounter for general adult medical examination with abnormal findings (Primary)    2. Type 2 diabetes mellitus without complication, without long-term current use of insulin  -     POC Glycosylated Hemoglobin (Hb A1C)  -     POC Glucose, Blood    3. Primary hypertension    4. Mixed hyperlipidemia    5. Vitamin D deficiency    6. Polycystic ovarian syndrome    7. Class 3 severe obesity due to excess calories without serious comorbidity with body mass index (BMI) of 45.0 to 49.9 in adult    8. Congenital deafness    9. Depression, unspecified depression type    10. Anxiety    11. Screening for thyroid disorder           {Time Spent (Optional):09470}  Follow Up {Instructions Charge Capture  Follow-up Communications :23}  No follow-ups on file.  Patient was given instructions and counseling regarding her condition or for health maintenance advice. Please see specific information pulled into the AVS if appropriate.           "

## 2024-06-13 ENCOUNTER — OFFICE VISIT (OUTPATIENT)
Dept: OBSTETRICS AND GYNECOLOGY | Facility: CLINIC | Age: 29
End: 2024-06-13
Payer: MEDICARE

## 2024-06-13 VITALS
DIASTOLIC BLOOD PRESSURE: 80 MMHG | WEIGHT: 293 LBS | HEIGHT: 67 IN | SYSTOLIC BLOOD PRESSURE: 118 MMHG | BODY MASS INDEX: 45.99 KG/M2

## 2024-06-13 DIAGNOSIS — N93.9 ABNORMAL UTERINE BLEEDING (AUB): Primary | ICD-10-CM

## 2024-06-13 PROCEDURE — 3079F DIAST BP 80-89 MM HG: CPT | Performed by: NURSE PRACTITIONER

## 2024-06-13 PROCEDURE — 3074F SYST BP LT 130 MM HG: CPT | Performed by: NURSE PRACTITIONER

## 2024-06-13 PROCEDURE — 99212 OFFICE O/P EST SF 10 MIN: CPT | Performed by: NURSE PRACTITIONER

## 2024-06-13 NOTE — PROGRESS NOTES
Chief Complaint   Patient presents with    Gynecologic Exam         Subjective   HPI  Rosa Craig is a 28 y.o. female, , her last LMP was No LMP recorded (lmp unknown)..  She presents for a follow up evaluation of Abnormal Uterine Bleeding. The patient was last seen  2024  ago by LUPE Adams. At that time she reported having a constant flow and fatigue. She had US performed. The plan was  TAMIKO . Since the last visit the patient reports the plan has improved her symptoms. She states she now has very light bleeding once a month that lasts 2-3 days. Denies cramping. This has been an issue in the past. The patient reports additional symptoms as none.      US was not done today.       Additional OB/GYN History   Last Pap :   Last Completed Pap Smear            PAP SMEAR (Every 3 Years) Next due on 2024  LIQUID-BASED PAP SMEAR WITH HPV GENOTYPING REGARDLESS OF INTERPRETATION ([facility]ISE,[facility])    2020  Patient-Reported (Performed Externally) - Normal reported by patient, Dr. Olsen    2020  Patient-Reported (Performed Externally) - Patient report normal Pap smear per GYN                  History of abnormal Pap smear: no  Tobacco Usage?: No       Current Outpatient Medications:     atorvastatin (LIPITOR) 20 MG tablet, Take 1 tablet by mouth every night at bedtime., Disp: 90 tablet, Rfl: 1    Blood Glucose Monitoring Suppl (ONE TOUCH ULTRA 2) w/Device kit, 1 Units Daily., Disp: 1 each, Rfl: 0    Cholecalciferol (Vitamin D) 50 MCG ( UT) tablet, Take 1 tablet by mouth Daily., Disp: , Rfl:     fluticasone (FLONASE) 50 MCG/ACT nasal spray, SHAKE LIQUID AND USE 1 TO 2 SPRAYS IN EACH NOSTRIL EVERY DAY AS NEEDED FOR ALLERGIES, Disp: 16 g, Rfl: 2    lisinopril (PRINIVIL,ZESTRIL) 10 MG tablet, TAKE 1 TABLET BY MOUTH DAILY, Disp: 90 tablet, Rfl: 3    norgestimate-ethinyl estradiol (ORTHO-CYCLEN) 0.25-35 MG-MCG per tablet, Take 1 tablet by mouth Daily., Disp: 28  "tablet, Rfl: 12    Glucose Blood (Blood Glucose Test Strips 333) strip, 1 Units by In Vitro route Daily., Disp: 100 strip, Rfl: 3    loratadine (CLARITIN) 10 MG tablet, Take 1 tablet by mouth Daily., Disp: 90 tablet, Rfl: 1     Past Medical History:   Diagnosis Date    Acute pharyngitis     Allergic     Allergic rhinitis due to pollen     Anxiety     Asthma     Congenital sensorineural hearing loss     Depression     Dysthymic disorder     Elevated blood pressure reading without diagnosis of hypertension     Ganglion cyst     Generalized anxiety disorder     Microalbuminuria     Mixed hyperlipidemia     Obesity     Onychomycosis     bilateral great toenail    Other fatigue     Other proteinuria     Polycystic ovarian syndrome     Prediabetes     Proteinuria     Tinea unguium     Viral infection     Vitamin D deficiency     Vitamin D deficiency         Past Surgical History:   Procedure Laterality Date    EXTERNAL EAR SURGERY Bilateral     WISDOM TOOTH EXTRACTION  2015       The additional following portions of the patient's history were reviewed and updated as appropriate: allergies, current medications, past family history, past medical history, past social history, past surgical history, and problem list.    Review of Systems   Constitutional: Negative.    Genitourinary: Negative.    All other systems reviewed and are negative.      I have reviewed and agree with the HPI, ROS, and historical information as entered above. Jaqueline Eckert, APRN      Objective   /80   Ht 170.2 cm (67.01\")   Wt 134 kg (295 lb 9.6 oz)   LMP  (LMP Unknown)   BMI 46.29 kg/m²     Physical Exam  Constitutional:       Appearance: Normal appearance.   Pulmonary:      Effort: Pulmonary effort is normal.   Neurological:      Mental Status: She is alert.         Assessment & Plan     Assessment     Problem List Items Addressed This Visit          Genitourinary and Reproductive     Abnormal uterine bleeding (AUB) - Primary    " Overview     AUB with know PCOS. Previously did well on TAMIKO and wanted to restart that. U/S at F/U endometrium- 10 mm, bilateral follicles. Bleeding has lightened since starting OCP. Pap non diagnostic due to blood, HPV negative, she has never been SA, plan repeat next year. At 3 month F/U doing well. BP normal.           Happy on TAMIKO, taking as prescribed.     Plan     Follow Up: Return in about 1 year (around 6/13/2025) for Annual physical.        Jaqueline Eckert, APRN  06/13/2024

## 2024-07-09 ENCOUNTER — OFFICE VISIT (OUTPATIENT)
Dept: FAMILY MEDICINE CLINIC | Facility: CLINIC | Age: 29
End: 2024-07-09
Payer: MEDICARE

## 2024-07-09 VITALS
SYSTOLIC BLOOD PRESSURE: 120 MMHG | HEIGHT: 67 IN | TEMPERATURE: 98.2 F | OXYGEN SATURATION: 97 % | DIASTOLIC BLOOD PRESSURE: 82 MMHG | WEIGHT: 293 LBS | HEART RATE: 65 BPM | BODY MASS INDEX: 45.99 KG/M2

## 2024-07-09 DIAGNOSIS — J30.1 SEASONAL ALLERGIC RHINITIS DUE TO POLLEN: ICD-10-CM

## 2024-07-09 DIAGNOSIS — B34.9 ACUTE VIRAL SYNDROME: ICD-10-CM

## 2024-07-09 DIAGNOSIS — J02.9 ACUTE PHARYNGITIS, UNSPECIFIED ETIOLOGY: Primary | ICD-10-CM

## 2024-07-09 LAB
EXPIRATION DATE: NORMAL
INTERNAL CONTROL: NORMAL
Lab: NORMAL
S PYO AG THROAT QL: NEGATIVE

## 2024-07-09 PROCEDURE — 3044F HG A1C LEVEL LT 7.0%: CPT | Performed by: INTERNAL MEDICINE

## 2024-07-09 PROCEDURE — 99214 OFFICE O/P EST MOD 30 MIN: CPT | Performed by: INTERNAL MEDICINE

## 2024-07-09 PROCEDURE — 3074F SYST BP LT 130 MM HG: CPT | Performed by: INTERNAL MEDICINE

## 2024-07-09 PROCEDURE — 1159F MED LIST DOCD IN RCRD: CPT | Performed by: INTERNAL MEDICINE

## 2024-07-09 PROCEDURE — 3079F DIAST BP 80-89 MM HG: CPT | Performed by: INTERNAL MEDICINE

## 2024-07-09 PROCEDURE — 1160F RVW MEDS BY RX/DR IN RCRD: CPT | Performed by: INTERNAL MEDICINE

## 2024-07-09 PROCEDURE — 87880 STREP A ASSAY W/OPTIC: CPT | Performed by: INTERNAL MEDICINE

## 2024-07-09 RX ORDER — LORATADINE 10 MG/1
10 TABLET ORAL DAILY
Qty: 90 TABLET | Refills: 1 | Status: SHIPPED | OUTPATIENT
Start: 2024-07-09

## 2024-07-09 NOTE — PROGRESS NOTES
Follow Up Office Visit      Date: 2024   Patient Name: Rosa Craig  : 1995   MRN: [id number]     Chief Complaint:    Chief Complaint   Patient presents with    Sore Throat     Felt little warm       History of Present Illness: Rosa Craig is a 28 y.o. female who is here today for onset last night of a sore throat associated with 4 to 5 days of some nasal congestion drainage and sneeze but no cough or wheeze, slight headache, feeling warm to touch but no documented fever.  Yesterday mild diarrhea which has not recurred today, transient nausea with some mild GERD symptoms, not aware of any ill contacts.  Appetite slightly diminished.  Ran out of Claritin prescription..    Subjective      Review of Systems:   Review of Systems    I have reviewed the patients family history, social history, past medical history, past surgical history and have updated it as appropriate.     Medications:     Current Outpatient Medications:     atorvastatin (LIPITOR) 20 MG tablet, Take 1 tablet by mouth every night at bedtime., Disp: 90 tablet, Rfl: 1    Blood Glucose Monitoring Suppl (ONE TOUCH ULTRA 2) w/Device kit, 1 Units Daily., Disp: 1 each, Rfl: 0    Cholecalciferol (Vitamin D) 50 MCG ( UT) tablet, Take 1 tablet by mouth Daily., Disp: , Rfl:     fluticasone (FLONASE) 50 MCG/ACT nasal spray, SHAKE LIQUID AND USE 1 TO 2 SPRAYS IN EACH NOSTRIL EVERY DAY AS NEEDED FOR ALLERGIES, Disp: 16 g, Rfl: 2    Glucose Blood (Blood Glucose Test Strips 333) strip, 1 Units by In Vitro route Daily., Disp: 100 strip, Rfl: 3    lisinopril (PRINIVIL,ZESTRIL) 10 MG tablet, TAKE 1 TABLET BY MOUTH DAILY, Disp: 90 tablet, Rfl: 3    loratadine (CLARITIN) 10 MG tablet, Take 1 tablet by mouth Daily., Disp: 90 tablet, Rfl: 1    norgestimate-ethinyl estradiol (ORTHO-CYCLEN) 0.25-35 MG-MCG per tablet, Take 1 tablet by mouth Daily., Disp: 28 tablet, Rfl: 12    Allergies:   Allergies   Allergen Reactions    Elemental Sulfur Rash    Sulfa  "Antibiotics Rash       Objective     Physical Exam: Please see above  Vital Signs:   Vitals:    07/09/24 1611   BP: 120/82   BP Location: Left arm   Patient Position: Sitting   Cuff Size: Adult   Pulse: 65   Temp: 98.2 °F (36.8 °C)   TempSrc: Temporal   SpO2: 97%   Weight: 135 kg (297 lb 9.6 oz)   Height: 170.2 cm (67\")     Body mass index is 46.61 kg/m².          Physical Exam  Constitutional:       General: She is not in acute distress.     Appearance: Normal appearance. She is obese. She is not ill-appearing.      Comments: Pleasant, not acutely ill, alert and oriented, hard of hearing with hearing aids, BMI 46.6, weight overall stable   HENT:      Head: Normocephalic and atraumatic.      Right Ear: Ear canal normal. There is impacted cerumen.      Left Ear: Ear canal normal. There is impacted cerumen.      Ears:      Comments: Unable to assess TMs given cerumen impaction     Nose: Congestion present. No rhinorrhea.      Comments: Sinuses nontender     Mouth/Throat:      Mouth: Mucous membranes are moist.      Pharynx: Posterior oropharyngeal erythema present. No oropharyngeal exudate.      Comments: Mild to moderate posterior erythema of the tonsillar pillars and soft palate, no exudate or petechiae  Cardiovascular:      Rate and Rhythm: Normal rate and regular rhythm.      Heart sounds: Normal heart sounds. No murmur heard.     No friction rub. No gallop.   Pulmonary:      Effort: Pulmonary effort is normal. No respiratory distress.      Breath sounds: Normal breath sounds.   Abdominal:      General: Bowel sounds are normal. There is no distension.      Palpations: Abdomen is soft. There is no mass.      Tenderness: There is no abdominal tenderness. There is no guarding or rebound.      Hernia: No hernia is present.   Musculoskeletal:      Cervical back: No rigidity or tenderness.   Lymphadenopathy:      Cervical: No cervical adenopathy.   Skin:     General: Skin is warm and dry.      Findings: No lesion or " rash.   Neurological:      Mental Status: She is alert and oriented to person, place, and time. Mental status is at baseline.      Comments: Chronic bilateral hearing deficit wearing hearing aids   Psychiatric:         Mood and Affect: Mood normal.         Behavior: Behavior normal.         Thought Content: Thought content normal.         Judgment: Judgment normal.         Procedures    Results:   Labs:   Hemoglobin A1C   Date Value Ref Range Status   06/12/2024 6.3 (A) 4.5 - 5.7 % Final     TSH   Date Value Ref Range Status   12/12/2023 1.180 0.450 - 4.500 uIU/mL Final        POCT Results (if applicable):   Results for orders placed or performed in visit on 07/09/24   POC Rapid Strep A    Specimen: Swab   Result Value Ref Range    Rapid Strep A Screen Negative Negative, VALID, INVALID, Not Performed    Internal Control Passed Passed    Lot Number [id number]     Expiration Date 10/30/2026        Assessment / Plan      Assessment/Plan:   Diagnoses and all orders for this visit:    1. Acute pharyngitis, unspecified etiology (Primary)  Assessment & Plan:  Rapid strep negative, consistent with a nonspecific viral process, recommending symptomatic treatment with Motrin or Tylenol, saline gargles, Chloraseptic spray, and tincture of time.  Advised will likely take several days for resolution.  Advise if not improving over that time interval or for any significant worsening of symptoms in the interim.    Orders:  -     POC Rapid Strep A    2. Acute viral syndrome  Assessment & Plan:  Clinical picture consistent with nonspecific viral process having a rapid strep negative pharyngitis associated with some upper respiratory symptoms as well as some mild nausea and diarrhea.  Symptomatic treatment.      3. Seasonal allergic rhinitis due to pollen  Assessment & Plan:  Mild seasonal allergies, represcribe loratadine 10 mg daily as needed continue use of her Flonase as needed.  Advise if not improving.    Orders:  -     loratadine  (CLARITIN) 10 MG tablet; Take 1 tablet by mouth Daily.  Dispense: 90 tablet; Refill: 1        Follow Up:   Return if symptoms worsen or fail to improve.      At [facility], we believe that sharing information builds trust and better relationships. You are receiving this note because you recently visited [facility]. It is possible you will see health information before a provider has talked with you about it. This kind of information can be easy to misunderstand. To help you fully understand what it means for your health, we urge you to discuss this note with your provider.    Danny Boss MD  [facility]

## 2024-07-09 NOTE — ASSESSMENT & PLAN NOTE
Rapid strep negative, consistent with a nonspecific viral process, recommending symptomatic treatment with Motrin or Tylenol, saline gargles, Chloraseptic spray, and tincture of time.  Advised will likely take several days for resolution.  Advise if not improving over that time interval or for any significant worsening of symptoms in the interim.

## 2024-07-09 NOTE — ASSESSMENT & PLAN NOTE
Mild seasonal allergies, represcribe loratadine 10 mg daily as needed continue use of her Flonase as needed.  Advise if not improving.

## 2024-07-09 NOTE — ASSESSMENT & PLAN NOTE
Clinical picture consistent with nonspecific viral process having a rapid strep negative pharyngitis associated with some upper respiratory symptoms as well as some mild nausea and diarrhea.  Symptomatic treatment.

## 2024-07-12 ENCOUNTER — TELEPHONE (OUTPATIENT)
Dept: FAMILY MEDICINE CLINIC | Facility: CLINIC | Age: 29
End: 2024-07-12
Payer: MEDICARE

## 2024-07-12 NOTE — TELEPHONE ENCOUNTER
Caller: USGEY RUIZ    Relationship: Emergency Contact    Best call back number: [phone]     What is the best time to reach you: ASAP    Who are you requesting to speak with (clinical staff, provider,  specific staff member): CLINICAL       What was the call regarding: PATIENT SEEN DR IRVIN MARSH 07/09/24 WITH A SORE THROAT. SHE WAS TOLD IT WAS NOT STREP IT WAS VIRAL BUT SHE HAS GOTTEN WORSE. FEELS LIKE A KNOT IS IN HER THROAT WHEN SHE SWALLOWS. SHE HAS NOT ATE. ONLY DRINKING GATORADE AND BROTH.    PATIENT WANTED TO BE SEEN AGAIN BUT WE DID NOT HAVE ANYTHING, PLEASE ADVISE AND CALL WITH SUGGESTIONS AND RECOMMENDATIONS ON WHAT TO DO.       [id number] - [place], [place] - [address] AT [facility] OF [facility] [address] - [phone]  - [phone] FX

## 2024-12-12 ENCOUNTER — OFFICE VISIT (OUTPATIENT)
Dept: FAMILY MEDICINE CLINIC | Facility: CLINIC | Age: 29
End: 2024-12-12
Payer: MEDICARE

## 2024-12-12 VITALS
BODY MASS INDEX: 45.99 KG/M2 | DIASTOLIC BLOOD PRESSURE: 81 MMHG | TEMPERATURE: 98.6 F | HEART RATE: 90 BPM | HEIGHT: 67 IN | OXYGEN SATURATION: 98 % | RESPIRATION RATE: 18 BRPM | SYSTOLIC BLOOD PRESSURE: 108 MMHG | WEIGHT: 293 LBS

## 2024-12-12 DIAGNOSIS — E66.01 CLASS 3 SEVERE OBESITY DUE TO EXCESS CALORIES WITHOUT SERIOUS COMORBIDITY WITH BODY MASS INDEX (BMI) OF 45.0 TO 49.9 IN ADULT: ICD-10-CM

## 2024-12-12 DIAGNOSIS — Z13.29 SCREENING FOR THYROID DISORDER: ICD-10-CM

## 2024-12-12 DIAGNOSIS — E11.9 TYPE 2 DIABETES MELLITUS WITHOUT COMPLICATION, WITHOUT LONG-TERM CURRENT USE OF INSULIN: ICD-10-CM

## 2024-12-12 DIAGNOSIS — Z28.9 DELAYED IMMUNIZATIONS: ICD-10-CM

## 2024-12-12 DIAGNOSIS — I10 PRIMARY HYPERTENSION: ICD-10-CM

## 2024-12-12 DIAGNOSIS — E55.9 VITAMIN D DEFICIENCY: ICD-10-CM

## 2024-12-12 DIAGNOSIS — E66.813 CLASS 3 SEVERE OBESITY DUE TO EXCESS CALORIES WITHOUT SERIOUS COMORBIDITY WITH BODY MASS INDEX (BMI) OF 45.0 TO 49.9 IN ADULT: ICD-10-CM

## 2024-12-12 DIAGNOSIS — E78.2 MIXED HYPERLIPIDEMIA: ICD-10-CM

## 2024-12-12 DIAGNOSIS — J06.9 VIRAL URI WITH COUGH: ICD-10-CM

## 2024-12-12 DIAGNOSIS — U07.1 COVID-19 VIRUS INFECTION: Primary | ICD-10-CM

## 2024-12-12 LAB
EXPIRATION DATE: ABNORMAL
EXPIRATION DATE: ABNORMAL
EXPIRATION DATE: NORMAL
FLUAV AG UPPER RESP QL IA.RAPID: NOT DETECTED
FLUBV AG UPPER RESP QL IA.RAPID: NOT DETECTED
GLUCOSE BLDC GLUCOMTR-MCNC: 119 MG/DL (ref 70–130)
HBA1C MFR BLD: 6.3 % (ref 4.5–5.7)
INTERNAL CONTROL: ABNORMAL
Lab: ABNORMAL
Lab: ABNORMAL
Lab: NORMAL
SARS-COV-2 AG UPPER RESP QL IA.RAPID: DETECTED

## 2024-12-12 PROCEDURE — G2211 COMPLEX E/M VISIT ADD ON: HCPCS | Performed by: INTERNAL MEDICINE

## 2024-12-12 PROCEDURE — 83036 HEMOGLOBIN GLYCOSYLATED A1C: CPT | Performed by: INTERNAL MEDICINE

## 2024-12-12 PROCEDURE — 3079F DIAST BP 80-89 MM HG: CPT | Performed by: INTERNAL MEDICINE

## 2024-12-12 PROCEDURE — 99214 OFFICE O/P EST MOD 30 MIN: CPT | Performed by: INTERNAL MEDICINE

## 2024-12-12 PROCEDURE — 1160F RVW MEDS BY RX/DR IN RCRD: CPT | Performed by: INTERNAL MEDICINE

## 2024-12-12 PROCEDURE — 87428 SARSCOV & INF VIR A&B AG IA: CPT | Performed by: INTERNAL MEDICINE

## 2024-12-12 PROCEDURE — 3044F HG A1C LEVEL LT 7.0%: CPT | Performed by: INTERNAL MEDICINE

## 2024-12-12 PROCEDURE — 1159F MED LIST DOCD IN RCRD: CPT | Performed by: INTERNAL MEDICINE

## 2024-12-12 PROCEDURE — 3074F SYST BP LT 130 MM HG: CPT | Performed by: INTERNAL MEDICINE

## 2024-12-12 PROCEDURE — 82947 ASSAY GLUCOSE BLOOD QUANT: CPT | Performed by: INTERNAL MEDICINE

## 2024-12-12 PROCEDURE — 1126F AMNT PAIN NOTED NONE PRSNT: CPT | Performed by: INTERNAL MEDICINE

## 2024-12-12 RX ORDER — BROMPHENIRAMINE MALEATE, PSEUDOEPHEDRINE HYDROCHLORIDE, AND DEXTROMETHORPHAN HYDROBROMIDE 2; 30; 10 MG/5ML; MG/5ML; MG/5ML
SYRUP ORAL
Qty: 240 ML | Refills: 0 | Status: SHIPPED | OUTPATIENT
Start: 2024-12-12

## 2024-12-12 RX ORDER — LISINOPRIL 10 MG/1
10 TABLET ORAL DAILY
Qty: 90 TABLET | Refills: 3 | Status: SHIPPED | OUTPATIENT
Start: 2024-12-12

## 2024-12-12 NOTE — ASSESSMENT & PLAN NOTE
Rapid COVID-19 positive, influenza screen negative.  Treat as noted above, symptomatic treatment with fluids Bromfed-DM Motrin or Tylenol and rest.  Advise if not improving

## 2024-12-12 NOTE — ASSESSMENT & PLAN NOTE
Weight unchanged over the last several months.  Encouraged to increase her exercise, well pursuing a healthy diet.  Unable to afford GLP-1 agonist, has previously declined recommendation for phentermine.  Will rediscuss at her follow-up visit   Epidermal Sutures: 4-0 Ethilon

## 2024-12-12 NOTE — ASSESSMENT & PLAN NOTE
Taking atorvastatin 20 mg nightly.  Update lipid profile.  Pursue healthy lifestyle with diet and exercise.

## 2024-12-12 NOTE — ASSESSMENT & PLAN NOTE
Very satisfactory blood pressure control acutely, chronic control unknown as not checking pressures.  Encouraged to do so, in the interim continuing her lisinopril 10 mg daily with ideal parameters discussed.

## 2024-12-12 NOTE — PROGRESS NOTES
Follow Up Office Visit      Date: 2024   Patient Name: Rosa Craig  : 1995   MRN: [id number]     Chief Complaint:    Chief Complaint   Patient presents with    follow up diabetes    Nasal Congestion       History of Present Illness: Rosa Craig is a 29 y.o. female who is here today for scheduled 6-month review of her diet-controlled diabetes.  Blood sugars checked weekly a.m. in the , with a hemoglobin A1c today of 6.3% unchanged from 6.3% in 2024.  Generally follows a healthy diet though is quite sedentary.  Has hypertension taking lisinopril 10 mg daily not checking blood pressures, no chronic chest pains palpitations dyspnea or edema, hyperlipidemia on atorvastatin.  Also discussed her obesity which is overall stable, patient Meding that she is not exercising.  Unrelated, patient describes 6 days of mildly progressive nasal congestion with some rhinorrhea, occasional cough, posttussive gagging, scratchy tickles in her ears and throat pain no dyspnea, pain, occasionally feeling hot and cold but no documented fever.  No GI symptoms, good urine output.  Lifelong non-smoker.    Subjective      Review of Systems:   Review of Systems    I have reviewed the patients family history, social history, past medical history, past surgical history and have updated it as appropriate.     Medications:     Current Outpatient Medications:     atorvastatin (LIPITOR) 20 MG tablet, Take 1 tablet by mouth every night at bedtime., Disp: 90 tablet, Rfl: 1    Blood Glucose Monitoring Suppl (ONE TOUCH ULTRA 2) w/Device kit, 1 Units Daily., Disp: 1 each, Rfl: 0    Cholecalciferol (Vitamin D) 50 MCG ( UT) tablet, Take 1 tablet by mouth Daily., Disp: , Rfl:     fluticasone (FLONASE) 50 MCG/ACT nasal spray, SHAKE LIQUID AND USE 1 TO 2 SPRAYS IN EACH NOSTRIL EVERY DAY AS NEEDED FOR ALLERGIES, Disp: 16 g, Rfl: 2    Glucose Blood (Blood Glucose Test Strips 333) strip, 1 Units by In Vitro route Daily., Disp: 100  "strip, Rfl: 3    lisinopril (PRINIVIL,ZESTRIL) 10 MG tablet, Take 1 tablet by mouth Daily., Disp: 90 tablet, Rfl: 3    loratadine (CLARITIN) 10 MG tablet, Take 1 tablet by mouth Daily., Disp: 90 tablet, Rfl: 1    norgestimate-ethinyl estradiol (ORTHO-CYCLEN) 0.25-35 MG-MCG per tablet, Take 1 tablet by mouth Daily., Disp: 28 tablet, Rfl: 12    brompheniramine-pseudoephedrine-DM 30-2-10 MG/5ML syrup, 10 mL orally every 4 hours as needed for cold symptoms, Disp: 240 mL, Rfl: 0    Allergies:   Allergies   Allergen Reactions    Elemental Sulfur Rash    Sulfa Antibiotics Rash       Objective     Physical Exam: Please see above  Vital Signs:   Vitals:    12/12/24 1013   BP: 108/81   BP Location: Left arm   Patient Position: Sitting   Cuff Size: Adult   Pulse: 90   Resp: 18   Temp: 98.6 °F (37 °C)   TempSrc: Temporal   SpO2: 98%   Weight: 134 kg (296 lb 3.2 oz)   Height: 170.2 cm (67\")   PainSc: 0-No pain     Body mass index is 46.39 kg/m².          Physical Exam  Constitutional:       General: She is not in acute distress.     Appearance: Normal appearance. She is obese. She is ill-appearing. She is not toxic-appearing.      Comments: Minimally ill-appearing hydrated 21-year-old female, NAD, BMI 46.3, unchanged over the last several months   HENT:      Right Ear: There is impacted cerumen.      Left Ear: There is impacted cerumen.      Ears:      Comments: Unable to assess the TMs given bilateral cerumen impactions     Nose: Congestion present. No rhinorrhea.      Mouth/Throat:      Mouth: Mucous membranes are moist.      Pharynx: Oropharynx is clear. Posterior oropharyngeal erythema present. No oropharyngeal exudate.      Comments: Mild posterior erythema with no exudate petechiae or ulcerations  Eyes:      Conjunctiva/sclera: Conjunctivae normal.   Cardiovascular:      Rate and Rhythm: Normal rate and regular rhythm.      Heart sounds: Normal heart sounds. No murmur heard.     No friction rub. No gallop.   Pulmonary:     "  Effort: Pulmonary effort is normal. No respiratory distress.      Breath sounds: Normal breath sounds. No stridor. No wheezing, rhonchi or rales.      Comments: No current cough  Chest:      Chest wall: No tenderness.   Musculoskeletal:      Cervical back: No rigidity or tenderness.   Lymphadenopathy:      Cervical: No cervical adenopathy.   Skin:     Capillary Refill: Capillary refill takes less than 2 seconds.   Neurological:      General: No focal deficit present.      Mental Status: She is alert.      Comments: Bipedal exam with 2+ pulses, no pitting edema, normal sensation of both feet to fine touch vibration and pinprick with motor exam normal   Psychiatric:         Mood and Affect: Mood normal.     Diabetic Foot Exam Performed and Monofilament Test Performed     Procedures    Results:   Labs:   Hemoglobin A1C   Date Value Ref Range Status   12/12/2024 6.3 (A) 4.5 - 5.7 % Final     TSH   Date Value Ref Range Status   12/12/2023 1.180 0.450 - 4.500 uIU/mL Final        POCT Results (if applicable):   Results for orders placed or performed in visit on 12/12/24   POC Glycosylated Hemoglobin (Hb A1C)    Collection Time: 12/12/24 10:31 AM    Specimen: Blood   Result Value Ref Range    Hemoglobin A1C 6.3 (A) 4.5 - 5.7 %    Lot Number [id number]     Expiration Date 09/06/2026    POC Glucose, Blood    Collection Time: 12/12/24 10:31 AM    Specimen: Blood   Result Value Ref Range    Glucose 119 70 - 130 mg/dL    Lot Number [id number]     Expiration Date 06/02/2025    POCT SARS-CoV-2 + Flu Antigen KAM    Collection Time: 12/12/24 11:06 AM    Specimen: Swab   Result Value Ref Range    SARS Antigen Detected (A) Not Detected, Presumptive Negative    Influenza A Antigen KAM Not Detected Not Detected    Influenza B Antigen KAM Not Detected Not Detected    Internal Control Passed Passed    Lot Number [id number]     Expiration Date 10/23/2025          Assessment / Plan      Assessment/Plan:   Diagnoses and all orders for this  visit:    1. COVID-19 virus infection (Primary)  Assessment & Plan:  Rapid COVID-19 screen today positive, influenza screen negative.  Present symptoms for 6 days.  Does not appear significantly ill.  Encouraged symptomatic treatment with fluids, Motrin or Tylenol, and Bromfed-DM.  Anticipate gradual improvement over the next several days.  She understands to stay in isolation until her symptoms are significantly improved and near resolution over 24 hours.  Work release given to reflect that anticipated time for resolution.  Advise if any significant worsening in the interim.  Very clinically stable at time of office discharge.    Orders:  -     POCT SARS-CoV-2 + Flu Antigen KAM    2. Viral URI with cough  Assessment & Plan:  Rapid COVID-19 positive, influenza screen negative.  Treat as noted above, symptomatic treatment with fluids Bromfed-DM Motrin or Tylenol and rest.  Advise if not improving    Orders:  -     POCT SARS-CoV-2 + Flu Antigen KAM  -     brompheniramine-pseudoephedrine-DM 30-2-10 MG/5ML syrup; 10 mL orally every 4 hours as needed for cold symptoms  Dispense: 240 mL; Refill: 0    3. Type 2 diabetes mellitus without complication, without long-term current use of insulin  Assessment & Plan:  Diet-controlled diabetic, hemoglobin A1c stable at 6.3% versus 6.3% in 6/2024.  Indicates she pursues a healthy diet though she is quite sedentary, and we discussed the need to improve all parameters.  She is on an ACE inhibitor.  Is behind diabetic eye evaluation and encouraged to update.  No known secondary sequelae.  Follow-up in 6 months for review and repeat hemoglobin A1c at her recommended complete physical.    Orders:  -     POC Glycosylated Hemoglobin (Hb A1C)  -     POC Glucose, Blood  -     POC Microalbumin    4. Primary hypertension  Assessment & Plan:  Very satisfactory blood pressure control acutely, chronic control unknown as not checking pressures.  Encouraged to do so, in the interim continuing her  lisinopril 10 mg daily with ideal parameters discussed.    Orders:  -     Comprehensive Metabolic Panel; Future  -     Urinalysis With Culture If Indicated -; Future  -     lisinopril (PRINIVIL,ZESTRIL) 10 MG tablet; Take 1 tablet by mouth Daily.  Dispense: 90 tablet; Refill: 3    5. Mixed hyperlipidemia  Assessment & Plan:  Taking atorvastatin 20 mg nightly.  Update lipid profile.  Pursue healthy lifestyle with diet and exercise.    Orders:  -     Lipid Panel; Future    6. Class 3 severe obesity due to excess calories without serious comorbidity with body mass index (BMI) of 45.0 to 49.9 in adult  Assessment & Plan:  Weight unchanged over the last several months.  Encouraged to increase her exercise, well pursuing a healthy diet.  Unable to afford GLP-1 agonist, has previously declined recommendation for phentermine.  Will rediscuss at her follow-up visit      7. Vitamin D deficiency  -     Vitamin D,25-Hydroxy; Future    8. Screening for thyroid disorder  -     TSH Rfx On Abnormal To Free T4; Future    9. Delayed immunizations  Assessment & Plan:  Recommend influenza COVID-19 vaccines delayed until she recovers from her acute illness. COVID-19 vaccine will need to be deferred for 3 months after she recovers from her current illness.          Follow Up:   Return in about 6 months (around 6/12/2025) for Medicare Subsq..      At [facility], we believe that sharing information builds trust and better relationships. You are receiving this note because you recently visited [facility]. It is possible you will see health information before a provider has talked with you about it. This kind of information can be easy to misunderstand. To help you fully understand what it means for your health, we urge you to discuss this note with your provider.    Danny Boss MD  [facility]

## 2024-12-12 NOTE — ASSESSMENT & PLAN NOTE
Rapid COVID-19 screen today positive, influenza screen negative.  Present symptoms for 6 days.  Does not appear significantly ill.  Encouraged symptomatic treatment with fluids, Motrin or Tylenol, and Bromfed-DM.  Anticipate gradual improvement over the next several days.  She understands to stay in isolation until her symptoms are significantly improved and near resolution over 24 hours.  Work release given to reflect that anticipated time for resolution.  Advise if any significant worsening in the interim.  Very clinically stable at time of office discharge.

## 2024-12-12 NOTE — ASSESSMENT & PLAN NOTE
Diet-controlled diabetic, hemoglobin A1c stable at 6.3% versus 6.3% in 6/2024.  Indicates she pursues a healthy diet though she is quite sedentary, and we discussed the need to improve all parameters.  She is on an ACE inhibitor.  Is behind diabetic eye evaluation and encouraged to update.  No known secondary sequelae.  Follow-up in 6 months for review and repeat hemoglobin A1c at her recommended complete physical.

## 2024-12-12 NOTE — ASSESSMENT & PLAN NOTE
Recommend influenza COVID-19 vaccines delayed until she recovers from her acute illness. COVID-19 vaccine will need to be deferred for 3 months after she recovers from her current illness.

## 2024-12-16 ENCOUNTER — OFFICE VISIT (OUTPATIENT)
Dept: FAMILY MEDICINE CLINIC | Facility: CLINIC | Age: 29
End: 2024-12-16
Payer: MEDICARE

## 2024-12-16 VITALS
HEIGHT: 67 IN | BODY MASS INDEX: 45.99 KG/M2 | OXYGEN SATURATION: 97 % | TEMPERATURE: 98.4 F | HEART RATE: 90 BPM | SYSTOLIC BLOOD PRESSURE: 124 MMHG | RESPIRATION RATE: 18 BRPM | DIASTOLIC BLOOD PRESSURE: 78 MMHG | WEIGHT: 293 LBS

## 2024-12-16 DIAGNOSIS — E11.9 TYPE 2 DIABETES MELLITUS WITHOUT COMPLICATION, WITHOUT LONG-TERM CURRENT USE OF INSULIN: ICD-10-CM

## 2024-12-16 DIAGNOSIS — U07.1 COVID-19 VIRUS INFECTION: Primary | ICD-10-CM

## 2024-12-16 DIAGNOSIS — E78.2 MIXED HYPERLIPIDEMIA: ICD-10-CM

## 2024-12-16 DIAGNOSIS — I10 PRIMARY HYPERTENSION: ICD-10-CM

## 2024-12-16 DIAGNOSIS — E55.9 VITAMIN D DEFICIENCY: ICD-10-CM

## 2024-12-16 DIAGNOSIS — Z13.29 SCREENING FOR THYROID DISORDER: ICD-10-CM

## 2024-12-16 PROCEDURE — 1160F RVW MEDS BY RX/DR IN RCRD: CPT | Performed by: INTERNAL MEDICINE

## 2024-12-16 PROCEDURE — 1126F AMNT PAIN NOTED NONE PRSNT: CPT | Performed by: INTERNAL MEDICINE

## 2024-12-16 PROCEDURE — 3078F DIAST BP <80 MM HG: CPT | Performed by: INTERNAL MEDICINE

## 2024-12-16 PROCEDURE — G2211 COMPLEX E/M VISIT ADD ON: HCPCS | Performed by: INTERNAL MEDICINE

## 2024-12-16 PROCEDURE — 36415 COLL VENOUS BLD VENIPUNCTURE: CPT | Performed by: INTERNAL MEDICINE

## 2024-12-16 PROCEDURE — 3044F HG A1C LEVEL LT 7.0%: CPT | Performed by: INTERNAL MEDICINE

## 2024-12-16 PROCEDURE — 99214 OFFICE O/P EST MOD 30 MIN: CPT | Performed by: INTERNAL MEDICINE

## 2024-12-16 PROCEDURE — 3074F SYST BP LT 130 MM HG: CPT | Performed by: INTERNAL MEDICINE

## 2024-12-16 PROCEDURE — 1159F MED LIST DOCD IN RCRD: CPT | Performed by: INTERNAL MEDICINE

## 2024-12-16 NOTE — ASSESSMENT & PLAN NOTE
Very satisfactory control in this diet-controlled diabetic with hemoglobin A1c last week stable at 6.3%.  She has been advised to increase her activity level and continue portion control with healthy food choices.  Does take ACE inhibitor.  Encouraged update diabetic eye evaluation.  Plan repeat hemoglobin A1c in 6 months at her complete physical.

## 2024-12-16 NOTE — PROGRESS NOTES
Follow Up Office Visit      Date: 2024   Patient Name: Rosa Craig  : 1995   MRN: [id number]     Chief Complaint:    Chief Complaint   Patient presents with    not feeling any better     Covid positive       History of Present Illness: Rosa Craig is a 29 y.o. female who is here today for symptoms of nasal congestion and drainage being cough, scratchy sore throat, today developing diarrheal stool x 2 but no nausea vomiting, no current fever.  Slight headache.  She was seen in the office 4 days ago at which time she was diagnosed with COVID-19, overall feeling like she is a little bit better in general but concerned that she is not resolving her symptoms as quickly as she would have anticipated.  She also is here for reattempt at lab draw, having had success her last visit presumably given some volume depletion at the time.    Subjective      Review of Systems:   Review of Systems    I have reviewed the patients family history, social history, past medical history, past surgical history and have updated it as appropriate.     Medications:     Current Outpatient Medications:     atorvastatin (LIPITOR) 20 MG tablet, Take 1 tablet by mouth every night at bedtime., Disp: 90 tablet, Rfl: 1    Blood Glucose Monitoring Suppl (ONE TOUCH ULTRA 2) w/Device kit, 1 Units Daily., Disp: 1 each, Rfl: 0    brompheniramine-pseudoephedrine-DM 30-2-10 MG/5ML syrup, 10 mL orally every 4 hours as needed for cold symptoms, Disp: 240 mL, Rfl: 0    Cholecalciferol (Vitamin D) 50 MCG (2000 UT) tablet, Take 1 tablet by mouth Daily., Disp: , Rfl:     fluticasone (FLONASE) 50 MCG/ACT nasal spray, SHAKE LIQUID AND USE 1 TO 2 SPRAYS IN EACH NOSTRIL EVERY DAY AS NEEDED FOR ALLERGIES, Disp: 16 g, Rfl: 2    Glucose Blood (Blood Glucose Test Strips 333) strip, 1 Units by In Vitro route Daily., Disp: 100 strip, Rfl: 3    lisinopril (PRINIVIL,ZESTRIL) 10 MG tablet, Take 1 tablet by mouth Daily., Disp: 90 tablet, Rfl: 3     "loratadine (CLARITIN) 10 MG tablet, Take 1 tablet by mouth Daily., Disp: 90 tablet, Rfl: 1    norgestimate-ethinyl estradiol (ORTHO-CYCLEN) 0.25-35 MG-MCG per tablet, Take 1 tablet by mouth Daily., Disp: 28 tablet, Rfl: 12    Allergies:   Allergies   Allergen Reactions    Elemental Sulfur Rash    Sulfa Antibiotics Rash       Objective     Physical Exam: Please see above  Vital Signs:   Vitals:    12/16/24 1347   BP: 124/78   BP Location: Left arm   Patient Position: Sitting   Cuff Size: Adult   Pulse: 90   Resp: 18   Temp: 98.4 °F (36.9 °C)   TempSrc: Temporal   SpO2: 97%   Weight: 134 kg (296 lb 4.8 oz)   Height: 170.2 cm (67\")     Body mass index is 46.41 kg/m².          Physical Exam  Constitutional:       General: She is not in acute distress.     Appearance: Normal appearance. She is obese. She is not ill-appearing or toxic-appearing.      Comments: Pleasant generally healthy 29-year-old female with mild acute malaise, hydrated, NAD, BMI 46.4, weight stable over the last 4 days   HENT:      Right Ear: Tympanic membrane and ear canal normal.      Left Ear: Tympanic membrane and ear canal normal.      Ears:      Comments: Hearing aids removed prior to exam     Nose: Congestion and rhinorrhea present.      Mouth/Throat:      Mouth: Mucous membranes are moist.      Pharynx: No oropharyngeal exudate or posterior oropharyngeal erythema.   Cardiovascular:      Rate and Rhythm: Normal rate and regular rhythm.      Heart sounds: Normal heart sounds. No murmur heard.     No friction rub. No gallop.   Pulmonary:      Effort: No respiratory distress.      Breath sounds: Normal breath sounds. No stridor. No wheezing, rhonchi or rales.      Comments: Lungs clear with no wheeze tachypnea dyspnea or cough  Abdominal:      General: Bowel sounds are normal. There is no distension.      Palpations: Abdomen is soft. There is no mass.      Tenderness: There is no abdominal tenderness. There is no guarding or rebound.      Hernia: " No hernia is present.   Musculoskeletal:      Cervical back: No rigidity or tenderness.   Lymphadenopathy:      Cervical: No cervical adenopathy.   Skin:     Findings: No rash.   Neurological:      Mental Status: She is alert and oriented to person, place, and time. Mental status is at baseline.      Comments: Chronic hearing deficit wearing hearing aids otherwise nonfocal   Psychiatric:         Mood and Affect: Mood normal.         Procedures    Results:   Labs:   Hemoglobin A1C   Date Value Ref Range Status   12/12/2024 6.3 (A) 4.5 - 5.7 % Final     TSH   Date Value Ref Range Status   12/12/2023 1.180 0.450 - 4.500 uIU/mL Final        POCT Results (if applicable):   Results for orders placed or performed in visit on 12/12/24   POC Glycosylated Hemoglobin (Hb A1C)    Collection Time: 12/12/24 10:31 AM    Specimen: Blood   Result Value Ref Range    Hemoglobin A1C 6.3 (A) 4.5 - 5.7 %    Lot Number [id number]     Expiration Date 09/06/2026    POC Glucose, Blood    Collection Time: 12/12/24 10:31 AM    Specimen: Blood   Result Value Ref Range    Glucose 119 70 - 130 mg/dL    Lot Number [id number]     Expiration Date 06/02/2025    POCT SARS-CoV-2 + Flu Antigen KAM    Collection Time: 12/12/24 11:06 AM    Specimen: Swab   Result Value Ref Range    SARS Antigen Detected (A) Not Detected, Presumptive Negative    Influenza A Antigen KAM Not Detected Not Detected    Influenza B Antigen KAM Not Detected Not Detected    Internal Control Passed Passed    Lot Number [id number]     Expiration Date 10/23/2025        Imaging:   Assessment / Plan      Assessment/Plan:   Diagnoses and all orders for this visit:    1. COVID-19 virus infection (Primary)  Assessment & Plan:  Diagnosed with COVID-19 4 days ago, having presented given concern that her symptoms were not resolving as quickly as she anticipated, patient still having some respiratory symptoms with some mild diarrhea, though she does acknowledge things are improving.  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  Continue symptomatic treatment with fluids Motrin Tylenol and cough syrup.  Advise if any clinical decline, noting very clinically stable at time of office discharge today.      2. Type 2 diabetes mellitus without complication, without long-term current use of insulin  Assessment & Plan:  Very satisfactory control in this diet-controlled diabetic with hemoglobin A1c last week stable at 6.3%.  She has been advised to increase her activity level and continue portion control with healthy food choices.  Does take ACE inhibitor.  Encouraged update diabetic eye evaluation.  Plan repeat hemoglobin A1c in 6 months at her complete physical.    Orders:  -     Cancel: MicroAlbumin, Urine, Random - Urine, Clean Catch    3. Primary hypertension  Assessment & Plan:  Continues to maintain satisfactory blood pressure control taking lisinopril 10 mg daily.    Orders:  -     Comprehensive Metabolic Panel  -     Cancel: Urinalysis With Culture If Indicated -    4. Mixed hyperlipidemia  -     Lipid Panel    5. Vitamin D deficiency  Assessment & Plan:  Taking vitamin D supplementation at 2000 IU daily.  Update testing.    Orders:  -     Vitamin D,25-Hydroxy    6. Screening for thyroid disorder  -     TSH Rfx On Abnormal To Free T4      Follow Up:   Return in about 6 months (around 6/16/2025) for Annual physical.      At [facility], we believe that sharing information builds trust and better relationships. You are receiving this note because you recently visited [facility]. It is possible you will see health information before a provider has talked with you about it. This kind of information can be easy to misunderstand. To help you fully understand what it means for your health, we urge you to discuss this note with your provider.    Danny Boss MD  [facility]

## 2024-12-16 NOTE — PROGRESS NOTES
..  Venipuncture Blood Specimen Collection  Venipuncture performed in left arm by Jessica Lubin with good hemostasis. Patient tolerated the procedure well without complications.   12/16/24   Jessica Lubin

## 2024-12-16 NOTE — ASSESSMENT & PLAN NOTE
Diagnosed with COVID-19 4 days ago, having presented given concern that her symptoms were not resolving as quickly as she anticipated, patient still having some respiratory symptoms with some mild diarrhea, though she does acknowledge things are improving.  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  Continue symptomatic treatment with fluids Motrin Tylenol and cough syrup.  Advise if any clinical decline, noting very clinically stable at time of office discharge today.

## 2024-12-17 LAB
25(OH)D3+25(OH)D2 SERPL-MCNC: 13.8 NG/ML (ref 30–100)
ALBUMIN SERPL-MCNC: 4.4 G/DL (ref 4–5)
ALP SERPL-CCNC: 122 IU/L (ref 44–121)
ALT SERPL-CCNC: 42 IU/L (ref 0–32)
AST SERPL-CCNC: 30 IU/L (ref 0–40)
BILIRUB SERPL-MCNC: 0.3 MG/DL (ref 0–1.2)
BUN SERPL-MCNC: 7 MG/DL (ref 6–20)
BUN/CREAT SERPL: 10 (ref 9–23)
CALCIUM SERPL-MCNC: 8.9 MG/DL (ref 8.7–10.2)
CHLORIDE SERPL-SCNC: 100 MMOL/L (ref 96–106)
CHOLEST SERPL-MCNC: 197 MG/DL (ref 100–199)
CO2 SERPL-SCNC: 22 MMOL/L (ref 20–29)
CREAT SERPL-MCNC: 0.68 MG/DL (ref 0.57–1)
EGFRCR SERPLBLD CKD-EPI 2021: 122 ML/MIN/1.73
GLOBULIN SER CALC-MCNC: 2.7 G/DL (ref 1.5–4.5)
GLUCOSE SERPL-MCNC: 87 MG/DL (ref 70–99)
HDLC SERPL-MCNC: 38 MG/DL
LDLC SERPL CALC-MCNC: 120 MG/DL (ref 0–99)
POTASSIUM SERPL-SCNC: 4.3 MMOL/L (ref 3.5–5.2)
PROT SERPL-MCNC: 7.1 G/DL (ref 6–8.5)
SODIUM SERPL-SCNC: 140 MMOL/L (ref 134–144)
TRIGL SERPL-MCNC: 221 MG/DL (ref 0–149)
TSH SERPL DL<=0.005 MIU/L-ACNC: 2.05 UIU/ML (ref 0.45–4.5)
VLDLC SERPL CALC-MCNC: 39 MG/DL (ref 5–40)

## 2024-12-18 ENCOUNTER — TELEPHONE (OUTPATIENT)
Dept: FAMILY MEDICINE CLINIC | Facility: CLINIC | Age: 29
End: 2024-12-18
Payer: MEDICARE

## 2024-12-18 DIAGNOSIS — E55.9 VITAMIN D DEFICIENCY: Primary | ICD-10-CM

## 2024-12-18 NOTE — TELEPHONE ENCOUNTER
----- Message from Danny Boss sent at 12/18/2024  2:23 PM EST -----  Please advise patient her recent lab testing revealed vitamin D deficiency for which she will be called in vitamin D 5000 IU once daily with plan to repeat testing at her 6-month follow-up visit.  Thyroid testing was normal, chemistry profile revealed a very borderline elevation of one of her liver tests or ALT, which likely presents fatty liver, for which efforts at weight loss and healthy diet are recommended, plan to repeat this test in 6 months, and lipid profile revealing slight elevation or bad cholesterol or LDL as well as her triglyceride level, for which again healthy lifestyle should be pursued, plan to repeat this test in 1 year.    I have spoke with her regarding her lab results. TF

## 2025-02-21 RX ORDER — NORGESTIMATE AND ETHINYL ESTRADIOL 0.25-0.035
1 KIT ORAL DAILY
Qty: 84 TABLET | Refills: 0 | Status: SHIPPED | OUTPATIENT
Start: 2025-02-21

## 2025-03-13 ENCOUNTER — OFFICE VISIT (OUTPATIENT)
Dept: FAMILY MEDICINE CLINIC | Facility: CLINIC | Age: 30
End: 2025-03-13
Payer: MEDICARE

## 2025-03-13 VITALS
WEIGHT: 280.2 LBS | HEART RATE: 116 BPM | OXYGEN SATURATION: 98 % | RESPIRATION RATE: 18 BRPM | HEIGHT: 67 IN | DIASTOLIC BLOOD PRESSURE: 93 MMHG | SYSTOLIC BLOOD PRESSURE: 120 MMHG | TEMPERATURE: 98.2 F | BODY MASS INDEX: 43.98 KG/M2

## 2025-03-13 DIAGNOSIS — R19.7 VOMITING AND DIARRHEA: ICD-10-CM

## 2025-03-13 DIAGNOSIS — B34.9 ACUTE VIRAL SYNDROME: Primary | ICD-10-CM

## 2025-03-13 DIAGNOSIS — R11.10 VOMITING AND DIARRHEA: ICD-10-CM

## 2025-03-13 DIAGNOSIS — J02.9 ACUTE PHARYNGITIS, UNSPECIFIED ETIOLOGY: ICD-10-CM

## 2025-03-13 LAB
EXPIRATION DATE: NORMAL
EXPIRATION DATE: NORMAL
FLUAV AG UPPER RESP QL IA.RAPID: NOT DETECTED
FLUBV AG UPPER RESP QL IA.RAPID: NOT DETECTED
INTERNAL CONTROL: NORMAL
INTERNAL CONTROL: NORMAL
Lab: NORMAL
Lab: NORMAL
S PYO AG THROAT QL: NEGATIVE
SARS-COV-2 AG UPPER RESP QL IA.RAPID: NOT DETECTED

## 2025-03-13 RX ORDER — PROMETHAZINE HYDROCHLORIDE 25 MG/1
25 TABLET ORAL EVERY 6 HOURS PRN
Qty: 10 TABLET | Refills: 0 | Status: SHIPPED | OUTPATIENT
Start: 2025-03-13

## 2025-03-13 NOTE — ASSESSMENT & PLAN NOTE
COVID-19 and influenza screens negative.  Has mild nonspecific viral and GI symptoms.  Mildly ill but nontoxic in appearance.  Well-hydrated.  Patient declines need for any cough or cold medication, prescribed Phenergan for nausea.  Push fluids.  Advise if not improving or for any acute worsening over the next several days

## 2025-03-13 NOTE — ASSESSMENT & PLAN NOTE
COVID-19 influenza and strep screens are all negative.  Clinical picture consistent with a nonspecific viral illness.  Symptomatic treatment as detailed above, pushing fluids, patient declining need for cold medication, utilizing preferentially Tylenol over Motrin for symptom relief given NSAID likely to trigger GI upset.  Advise if not improving over several days or for any acute worsening of symptoms in the interim.

## 2025-03-13 NOTE — ASSESSMENT & PLAN NOTE
Nonspecific viral URI, having had a negative rapid COVID-19 and influenza screen today.  Does not appear to be volume depleted.  Push small frequent intake of clear liquids advance as tolerated, ensuring adequate intake, Imodium for severe diarrhea, and prescribed for nausea given previous intolerance to Zofran.  Advise if not improving over several days or for any acute worsening symptoms in the interim, noting very clinically stable at time of office discharge.

## 2025-03-13 NOTE — PROGRESS NOTES
Follow Up Office Visit      Date: 2025   Patient Name: Rosa Craig  : 1995   MRN: [id number]     Chief Complaint:    Chief Complaint   Patient presents with    Vomiting    Nasal Congestion    Cough       History of Present Illness: Rosa Craig is a 29 y.o. female who is here today for onset yesterday of dizziness malaise myalgias social last evening with a sore throat social send phlegm and tightness, nausea with several episodes of vomiting through early this morning associate with several months of loose diarrheal stools and stomach cramps, appetite diminished but still drinking fluids well with good urine output, mild cough and rhinorrhea, no obvious fever but has had some chills.  Not aware of any ill contacts.  Not taking anything for the symptoms currently, noting previously prescribed Zofran causes her to choke and feel worse.    Subjective      Review of Systems:   Review of Systems    I have reviewed the patients family history, social history, past medical history, past surgical history and have updated it as appropriate.     Medications:     Current Outpatient Medications:     atorvastatin (LIPITOR) 20 MG tablet, Take 1 tablet by mouth every night at bedtime., Disp: 90 tablet, Rfl: 1    Blood Glucose Monitoring Suppl (ONE TOUCH ULTRA 2) w/Device kit, 1 Units Daily., Disp: 1 each, Rfl: 0    brompheniramine-pseudoephedrine-DM 30-2-10 MG/5ML syrup, 10 mL orally every 4 hours as needed for cold symptoms, Disp: 240 mL, Rfl: 0    fluticasone (FLONASE) 50 MCG/ACT nasal spray, SHAKE LIQUID AND USE 1 TO 2 SPRAYS IN EACH NOSTRIL EVERY DAY AS NEEDED FOR ALLERGIES, Disp: 16 g, Rfl: 2    Glucose Blood (Blood Glucose Test Strips 333) strip, 1 Units by In Vitro route Daily., Disp: 100 strip, Rfl: 3    lisinopril (PRINIVIL,ZESTRIL) 10 MG tablet, Take 1 tablet by mouth Daily., Disp: 90 tablet, Rfl: 3    loratadine (CLARITIN) 10 MG tablet, Take 1 tablet by mouth Daily., Disp: 90 tablet, Rfl: 1     "norgestimate-ethinyl estradiol ([place]) 0.25-35 MG-MCG per tablet, TAKE 1 TABLET BY MOUTH DAILY, Disp: 84 tablet, Rfl: 0    vitamin D3 125 MCG (5000 UT) capsule capsule, Take 1 capsule by mouth Daily., Disp: 90 capsule, Rfl: 3    promethazine (PHENERGAN) 25 MG tablet, Take 1 tablet by mouth Every 6 (Six) Hours As Needed for Nausea or Vomiting., Disp: 10 tablet, Rfl: 0    Allergies:   Allergies   Allergen Reactions    Zofran [Ondansetron] Other (See Comments)     Choke, increasing nausea    Elemental Sulfur Rash    Sulfa Antibiotics Rash       Objective     Physical Exam: Please see above  Vital Signs:   Vitals:    03/13/25 1556 03/13/25 1640   BP:  120/93   Pulse: 116    Resp: 18    Temp: 98.2 °F (36.8 °C)    TempSrc: Temporal    SpO2: 98%    Weight: 127 kg (280 lb 3.2 oz)    Height: 170.2 cm (67\")      Body mass index is 43.89 kg/m².          Physical Exam  Constitutional:       General: She is not in acute distress.     Appearance: She is obese. She is ill-appearing. She is not toxic-appearing.      Comments: Pleasant, mildly ill nontoxic, hydrated, NAD, BMI 43.8   HENT:      Right Ear: There is impacted cerumen.      Left Ear: There is impacted cerumen.      Ears:      Comments: Bilateral hearing aids which were removed     Nose: Congestion present. No rhinorrhea.      Mouth/Throat:      Mouth: Mucous membranes are moist.      Pharynx: Posterior oropharyngeal erythema present. No oropharyngeal exudate.      Comments: Mild posterior erythema without exudate petechiae or ulceration  Eyes:      Conjunctiva/sclera: Conjunctivae normal.   Neck:      Comments: Mild mid anterior bilateral cervical tenderness with no adenopathy or mass  Cardiovascular:      Rate and Rhythm: Normal rate and regular rhythm. Tachycardia present.      Heart sounds: Normal heart sounds. No murmur heard.     No friction rub. No gallop.   Pulmonary:      Effort: Pulmonary effort is normal. No respiratory distress.      Breath sounds: Normal " breath sounds. No stridor. No wheezing, rhonchi or rales.      Comments: No cough or dyspnea  Abdominal:      General: Bowel sounds are normal. There is no distension.      Palpations: Abdomen is soft. There is no mass.      Tenderness: There is abdominal tenderness. There is no guarding or rebound.      Hernia: No hernia is present.      Comments: Abdominal obesity with mild generalized discomfort to palpation throughout her abdomen more so across the lower abdomen with no rebound or guarding, normal bowel sounds   Musculoskeletal:      Cervical back: Tenderness present. No rigidity.   Lymphadenopathy:      Cervical: No cervical adenopathy.   Skin:     General: Skin is warm and dry.      Capillary Refill: Capillary refill takes less than 2 seconds.   Neurological:      Mental Status: She is alert.   Psychiatric:         Mood and Affect: Mood normal.         Procedures    Results:   Labs:   Hemoglobin A1C   Date Value Ref Range Status   12/12/2024 6.3 (A) 4.5 - 5.7 % Final     TSH   Date Value Ref Range Status   12/16/2024 2.050 0.450 - 4.500 uIU/mL Final        POCT Results (if applicable):   Results for orders placed or performed in visit on 03/13/25   POC Rapid Strep A    Collection Time: 03/13/25  4:32 PM    Specimen: Swab   Result Value Ref Range    Rapid Strep A Screen Negative Negative, VALID, INVALID, Not Performed    Internal Control Passed Passed    Lot Number [id number]     Expiration Date 3,052,027    Covid-19 + Flu A&B AG, Veritor    Collection Time: 03/13/25  4:36 PM    Specimen: Swab   Result Value Ref Range    SARS Antigen Not Detected Not Detected, Presumptive Negative    Influenza A Antigen KAM Not Detected Not Detected    Influenza B Antigen KAM Not Detected Not Detected    Internal Control Passed Passed    Lot Number [id number]     Expiration Date 3,052,026          Assessment / Plan      Assessment/Plan:   Diagnoses and all orders for this visit:    1. Acute viral syndrome (Primary)  Assessment &  Plan:  COVID-19 and influenza screens negative.  Has mild nonspecific viral and GI symptoms.  Mildly ill but nontoxic in appearance.  Well-hydrated.  Patient declines need for any cough or cold medication, prescribed Phenergan for nausea.  Push fluids.  Advise if not improving or for any acute worsening over the next several days    Orders:  -     Covid-19 + Flu A&B AG, Veritor  -     Cancel: POCT SARS-CoV-2 + Flu Antigen KAM    2. Vomiting and diarrhea  Assessment & Plan:  Nonspecific viral URI, having had a negative rapid COVID-19 and influenza screen today.  Does not appear to be volume depleted.  Push small frequent intake of clear liquids advance as tolerated, ensuring adequate intake, Imodium for severe diarrhea, and prescribed for nausea given previous intolerance to Zofran.  Advise if not improving over several days or for any acute worsening symptoms in the interim, noting very clinically stable at time of office discharge.    Orders:  -     Covid-19 + Flu A&B AG, Veritor  -     promethazine (PHENERGAN) 25 MG tablet; Take 1 tablet by mouth Every 6 (Six) Hours As Needed for Nausea or Vomiting.  Dispense: 10 tablet; Refill: 0  -     Cancel: POCT SARS-CoV-2 + Flu Antigen KAM    3. Acute pharyngitis, unspecified etiology  Assessment & Plan:  COVID-19 influenza and strep screens are all negative.  Clinical picture consistent with a nonspecific viral illness.  Symptomatic treatment as detailed above, pushing fluids, patient declining need for cold medication, utilizing preferentially Tylenol over Motrin for symptom relief given NSAID likely to trigger GI upset.  Advise if not improving over several days or for any acute worsening of symptoms in the interim.    Orders:  -     Covid-19 + Flu A&B AG, Veritor  -     POC Rapid Strep A  -     Cancel: POCT SARS-CoV-2 + Flu Antigen KAM        Follow Up:   Return if symptoms worsen or fail to improve.      At [facility], we believe that sharing information builds  trust and better relationships. You are receiving this note because you recently visited [facility]. It is possible you will see health information before a provider has talked with you about it. This kind of information can be easy to misunderstand. To help you fully understand what it means for your health, we urge you to discuss this note with your provider.    Danny Boss MD  [facility]

## 2025-05-20 RX ORDER — NORGESTIMATE AND ETHINYL ESTRADIOL 0.25-0.035
1 KIT ORAL DAILY
Qty: 84 TABLET | Refills: 0 | Status: SHIPPED | OUTPATIENT
Start: 2025-05-20

## 2025-06-13 ENCOUNTER — OFFICE VISIT (OUTPATIENT)
Dept: FAMILY MEDICINE CLINIC | Facility: CLINIC | Age: 30
End: 2025-06-13
Payer: MEDICARE

## 2025-06-13 VITALS
BODY MASS INDEX: 44.42 KG/M2 | TEMPERATURE: 98.2 F | WEIGHT: 283 LBS | HEART RATE: 71 BPM | HEIGHT: 67 IN | OXYGEN SATURATION: 99 % | SYSTOLIC BLOOD PRESSURE: 140 MMHG | DIASTOLIC BLOOD PRESSURE: 100 MMHG | RESPIRATION RATE: 18 BRPM

## 2025-06-13 DIAGNOSIS — E55.9 VITAMIN D DEFICIENCY: ICD-10-CM

## 2025-06-13 DIAGNOSIS — R80.9 MICROALBUMINURIA: ICD-10-CM

## 2025-06-13 DIAGNOSIS — E66.813 CLASS 3 SEVERE OBESITY DUE TO EXCESS CALORIES WITHOUT SERIOUS COMORBIDITY WITH BODY MASS INDEX (BMI) OF 40.0 TO 44.9 IN ADULT: ICD-10-CM

## 2025-06-13 DIAGNOSIS — E78.2 MIXED HYPERLIPIDEMIA: ICD-10-CM

## 2025-06-13 DIAGNOSIS — I10 PRIMARY HYPERTENSION: ICD-10-CM

## 2025-06-13 DIAGNOSIS — R73.03 PREDIABETES: ICD-10-CM

## 2025-06-13 DIAGNOSIS — Z00.01 ENCOUNTER FOR GENERAL ADULT MEDICAL EXAMINATION WITH ABNORMAL FINDINGS: ICD-10-CM

## 2025-06-13 DIAGNOSIS — E66.01 CLASS 3 SEVERE OBESITY DUE TO EXCESS CALORIES WITHOUT SERIOUS COMORBIDITY WITH BODY MASS INDEX (BMI) OF 40.0 TO 44.9 IN ADULT: ICD-10-CM

## 2025-06-13 DIAGNOSIS — J30.1 SEASONAL ALLERGIC RHINITIS DUE TO POLLEN: ICD-10-CM

## 2025-06-13 DIAGNOSIS — H90.5 CONGENITAL DEAFNESS: ICD-10-CM

## 2025-06-13 DIAGNOSIS — Z13.29 SCREENING FOR THYROID DISORDER: ICD-10-CM

## 2025-06-13 DIAGNOSIS — E28.2 POLYCYSTIC OVARIAN SYNDROME: ICD-10-CM

## 2025-06-13 DIAGNOSIS — Z80.3 FAMILY HISTORY OF BREAST CANCER: ICD-10-CM

## 2025-06-13 DIAGNOSIS — Z00.00 MEDICARE ANNUAL WELLNESS VISIT, SUBSEQUENT: Primary | ICD-10-CM

## 2025-06-13 LAB
EXPIRATION DATE: ABNORMAL
EXPIRATION DATE: NORMAL
EXPIRATION DATE: NORMAL
GLUCOSE BLDC GLUCOMTR-MCNC: 101 MG/DL (ref 70–130)
HBA1C MFR BLD: 6.2 % (ref 4.5–5.7)
Lab: ABNORMAL
Lab: NORMAL
Lab: NORMAL
POC ALBUMIN, URINE: 150 MG/L
POC CREATININE, URINE: 300 MG/DL
POC URINE ALB/CREA RATIO: NORMAL

## 2025-06-13 RX ORDER — PHENTERMINE HYDROCHLORIDE 15 MG/1
15 CAPSULE ORAL EVERY MORNING
Qty: 30 CAPSULE | Refills: 0 | Status: SHIPPED | OUTPATIENT
Start: 2025-06-13

## 2025-06-13 RX ORDER — LORATADINE 10 MG/1
10 TABLET ORAL DAILY
Qty: 90 TABLET | Refills: 3 | Status: SHIPPED | OUTPATIENT
Start: 2025-06-13

## 2025-06-13 NOTE — ASSESSMENT & PLAN NOTE
Mother with breast cancer diagnosed age 46.  Initiate screening mammogram in this patient 10 years prior at age 36

## 2025-06-13 NOTE — ASSESSMENT & PLAN NOTE
29-year-old female presenting for Medicare wellness visit and complete physical with specific health issues being addressed as detailed below, health maintenance includes follow-up scheduled with gynecology next week, all standard vaccinations up-to-date, EKG today unremarkable, updating screening labs.  Follow-up in 1 month to review clinical response to the initiation of phentermine for her obesity, as well as to follow-up her blood pressure, repeat UACR, and as needed in the interim

## 2025-06-13 NOTE — ASSESSMENT & PLAN NOTE
3 pound weight gain in the last 3 months, admittedly having been very sedentary with poor diet.  She is attempting to improve both parameters.  We also discussed medication weight loss options, and have agreed to initiate phentermine 15 mg daily with side effect profile potential discussed, and specifically monitoring her blood pressure.  Follow-up in 1 month for review and make further recommendations accordingly.  Did discuss ultimately concept of titration off of the phentermine and potentially the addition of Topamax in the future

## 2025-06-13 NOTE — ASSESSMENT & PLAN NOTE
Currently taking atorvastatin 20 mg nightly.  Update lipid profile with attempts to improve lifestyle with diet and exercise

## 2025-06-13 NOTE — ASSESSMENT & PLAN NOTE
Some improvement in her glycemic control with a hemoglobin A1c today of 6.2% versus 6.3% in 12/2024.  She does attempt to improve her lifestyle with diet and exercise.  Unfortunately GLP-1 agonist is not a financially viable option at this stage.  Attempting weight loss with the addition of phentermine.

## 2025-06-13 NOTE — PROGRESS NOTES
Venipuncture Blood Specimen Collection  Venipuncture performed in right arm by Miguelina Lockett MA with good hemostasis. Patient tolerated the procedure well without complications.   06/13/25   Miguelina Lockett MA

## 2025-06-13 NOTE — PROGRESS NOTES
Female Physical Note      Date: 2025   Patient Name: Rosa Craig  : 1995   MRN: [id number]     Chief Complaint:    Chief Complaint   Patient presents with    Medicare Wellness-subsequent       History of Present Illness: Rosa Craig is a 29 y.o. female who is here today for their annual health maintenance and physical.  On disability with Medicare based on hearing deficit.  Patient has multiple chronic health problems including prediabetes with hemoglobin A1c 6.3% in 2024, earlier this month 6.4% obtained through her health insurance, and today 6.2%, on diet control, admittedly not pursuing the healthiest diet nor physical activity.  She intends to improve both parameters.  Blood sugars checked sporadically are averaging in the 90s fasting.  Also has hypertension on lisinopril, not checking blood pressures regularly, no chest pains palpitations dyspnea dizziness or edema.  Hyperlipidemia on atorvastatin, polycystic ovary syndrome followed by gynecology on oral contraceptives, vitamin D deficiency on supplement, congenital deafness with hearing aids getting regular follow-up with audiology, obesity with 3 pound weight gain in the last 3 months, allergic rhinitis generally compensated with Claritin and Flonase, not currently having any asthma flare, previous anxiety and depression not problematic, no related meds.  No other concerns identified by patient.  Review of systems: Head and neck with hearing loss chronically otherwise negative, cardiopulmonary negative, GI negative,  negative, musculoskeletal negative, neurological with chronic hearing deficit occasionally having the tingling in her fingers or toes, moods currently doing well.  Health maintenance includes need for updated diabetic eye evaluation, scheduled for gynecology follow-up with last HPV negative and pap smear nondiagnostic 2024, all standard vaccines up-to-date encouraging to keep current with COVID-19 and flu vaccine  guidelines, EKG today stable and unremarkable.      Subjective      Review of Systems:   Review of Systems    Past Medical History, Social History, Family History and Care Team were all reviewed with patient and updated as appropriate.     Medications:     Current Outpatient Medications:     atorvastatin (LIPITOR) 20 MG tablet, Take 1 tablet by mouth every night at bedtime., Disp: 90 tablet, Rfl: 1    Blood Glucose Monitoring Suppl (ONE TOUCH ULTRA 2) w/Device kit, 1 Units Daily., Disp: 1 each, Rfl: 0    fluticasone (FLONASE) 50 MCG/ACT nasal spray, SHAKE LIQUID AND USE 1 TO 2 SPRAYS IN EACH NOSTRIL EVERY DAY AS NEEDED FOR ALLERGIES, Disp: 16 g, Rfl: 2    Glucose Blood (Blood Glucose Test Strips 333) strip, 1 Units by In Vitro route Daily., Disp: 100 strip, Rfl: 3    lisinopril (PRINIVIL,ZESTRIL) 10 MG tablet, Take 1 tablet by mouth Daily., Disp: 90 tablet, Rfl: 3    loratadine (CLARITIN) 10 MG tablet, Take 1 tablet by mouth Daily., Disp: 90 tablet, Rfl: 3    [place] 0.25-35 MG-MCG per tablet, TAKE 1 TABLET BY MOUTH DAILY, Disp: 84 tablet, Rfl: 0    promethazine (PHENERGAN) 25 MG tablet, Take 1 tablet by mouth Every 6 (Six) Hours As Needed for Nausea or Vomiting., Disp: 10 tablet, Rfl: 0    vitamin D3 125 MCG (5000 UT) capsule capsule, Take 1 capsule by mouth Daily., Disp: 90 capsule, Rfl: 3    phentermine 15 MG capsule, Take 1 capsule by mouth Every Morning., Disp: 30 capsule, Rfl: 0    Allergies:   Allergies   Allergen Reactions    Zofran [Ondansetron] Other (See Comments)     Choke, increasing nausea    Elemental Sulfur Rash    Sulfa Antibiotics Rash       Immunizations:  Health Maintenance Summary            Current Care Gaps       Hepatitis B (3 of 3 - 3-dose series) Overdue since 11/9/2007 06/12/2024  Postponed until 6/12/2025 by Neisha Laguna MA (Patient Refused)    09/14/2007  Imm Admin: DTaP / Hep B / IPV    02/03/1997  Imm Admin: Hep B, Adolescent or Pediatric              COVID-19 Vaccine (4 -  2024-25 season) Overdue since 9/1/2024 07/09/2024  Postponed until 12/31/2024 by Jessica Lubin (Patient Refused - refused)    06/12/2024  Postponed until 6/14/2024 by Neisha Laguna MA (Patient Refused)    03/16/2023  Postponed until 3/18/2023 by Jessica Lubin (Patient Refused)    04/12/2022  Imm Admin: COVID-19 (MODERNA) 1st,2nd,3rd Dose Monovalent    05/22/2021  Imm Admin: COVID-19 (MODERNA) 1st,2nd,3rd Dose Monovalent     Only the first 5 history entries have been loaded, but more history exists.            DIABETIC EYE EXAM (Yearly) Overdue since 11/23/2024 11/23/2023  Done    11/11/2022  Patient-Reported (Performed Externally)                      Awaiting Completion       LIPID PANEL (Yearly) Order placed this encounter      06/13/2025  Order placed for Lipid Panel by Danny Boss MD    12/16/2024  Lipid Panel    12/12/2023  Lipid Panel    06/28/2022  Outside Claim: CHG LIPID PANEL    06/28/2022  Done      Only the first 5 history entries have been loaded, but more history exists.                      Upcoming       INFLUENZA VACCINE (Yearly - July to March) Next due on 7/1/2025 12/12/2023  Imm Admin: Fluzone (or Fluarix & Flulaval for VFC) >6mos    12/09/2022  Imm Admin: Influenza, Unspecified    12/09/2022  Imm Admin: Fluzone (or Fluarix & Flulaval for VFC) >6mos    12/03/2021  Imm Admin: Influenza Seasonal Injectable    12/03/2021  Imm Admin: Influenza, Unspecified      Only the first 5 history entries have been loaded, but more history exists.              HEMOGLOBIN A1C (Every 6 Months) Next due on 12/13/2025 06/13/2025  Hemoglobin A1C component of POC Glycosylated Hemoglobin (Hb A1C)    12/12/2024  Hemoglobin A1C component of POC Glycosylated Hemoglobin (Hb A1C)    06/12/2024  Hemoglobin A1C component of POC Glycosylated Hemoglobin (Hb A1C)    12/12/2023  Hemoglobin A1C component of POC Glycosylated Hemoglobin (Hb A1C)    06/09/2023  Hemoglobin A1C component of POC  Glycosylated Hemoglobin (Hb A1C)      Only the first 5 history entries have been loaded, but more history exists.              ANNUAL WELLNESS VISIT (Yearly) Next due on 6/13/2026 06/13/2025  Level of Service: [place] PPPS, SUBSEQ VISIT    06/13/2025  Done    06/12/2024  Level of Service: [place] PPPS, SUBSEQ VISIT    03/16/2023  Postponed until 9/7/2023 by Jessica Lubin (Not Indicated)    12/09/2022  Done      Only the first 5 history entries have been loaded, but more history exists.              DIABETIC FOOT EXAM (Yearly) Next due on 6/13/2026 06/13/2025  SmartData: WORKFLOW - DIABETES - DIABETIC FOOT EXAM PERFORMED    06/13/2025  SmartData: FINDINGS - TESTS - NEUROLOGY - MONOFILAMENT TEST - MONOFILAMENT TEST PERFORMED    06/13/2025  SmartData: WORKFLOW - DIABETES - DIABETIC FOOT EXAM PERFORMED    12/12/2024  SmartData: FINDINGS - TESTS - NEUROLOGY - MONOFILAMENT TEST - MONOFILAMENT TEST PERFORMED    12/12/2024  SmartData: WORKFLOW - DIABETES - DIABETIC FOOT EXAM PERFORMED      Only the first 5 history entries have been loaded, but more history exists.              URINE MICROALBUMIN-CREATININE RATIO (uACR) (Yearly) Next due on 6/13/2026 06/13/2025  POC Albumin/Creatinine Ratio Urine              PAP SMEAR (Every 3 Years) Next due on 2/28/2027 02/29/2024  LIQUID-BASED PAP SMEAR WITH HPV GENOTYPING REGARDLESS OF INTERPRETATION (DENISE,COR,MAD)    03/16/2023  Postponed until 9/1/2023 by Jessica Lubin (Pending event)    02/01/2020  Patient-Reported (Performed Externally) - Patient report normal Pap smear per GYN    02/01/2020  Patient-Reported (Performed Externally) - Normal reported by patient, Dr. Olsen              TDAP/TD VACCINES (2 - Td or Tdap) Next due on 12/9/2032 12/09/2022  Imm Admin: Tdap                      Completed or No Longer Recommended       HEPATITIS C SCREENING  Completed      12/09/2022  Hep C Virus Ab component of Hepatitis C Antibody              Pneumococcal Vaccine  "0-49 (Series Information) Completed      12/09/2022  Imm Admin: Pneumococcal Conjugate 20-Valent (PCV20)              CHLAMYDIA SCREENING  Discontinued        Frequency changed to Never automatically (Topic No Longer Applies)    02/29/2024  LIQUID-BASED PAP SMEAR WITH HPV GENOTYPING REGARDLESS OF INTERPRETATION (DENISE,COR,MAD)    02/24/2020  Outside Claim: CHG CHYLMD TRACH, DNA, AMP PROBE                             No orders of the defined types were placed in this encounter.       Colorectal Screening:   N/A based upon age and average risk  Last Completed Colonoscopy    This patient has no relevant Health Maintenance data.       Pap: Followed regularly by gynecology with upcoming appointment next week  Last Completed Pap Smear            Upcoming       PAP SMEAR (Every 3 Years) Next due on 2/28/2027 02/29/2024  LIQUID-BASED PAP SMEAR WITH HPV GENOTYPING REGARDLESS OF INTERPRETATION (DENISE,COR,MAD)    02/01/2020  Patient-Reported (Performed Externally) - Patient report normal Pap smear per GYN    02/01/2020  Patient-Reported (Performed Externally) - Normal reported by patient, Dr. Olsen                           Mammogram: N/A based upon age, does not high risk with maternal breast cancer, mother diagnosed age 45 plan to initiate screening mammogram in this patient at age 36  Last Completed Mammogram    This patient has no relevant Health Maintenance data.          CT for Smoker (Age 50-80, 20 pk yr):   N/A  Bone Density/DEXA (Age 65 or high risk): N/A based upon age  Hep C (Age 18-79 once): Previously negative  HIV (Age 15-65 once): No results found for: \"HIV1X2\" previously negative  A1c:   Hemoglobin A1C   Date Value Ref Range Status   06/13/2025 6.2 (A) 4.5 - 5.7 % Final      Lipid panel:  No results found for: \"LIPIDEXCLUSI\"    The ASCVD Risk score (Beni DK, et al., 2019) failed to calculate for the following reasons:    The 2019 ASCVD risk score is only valid for ages 40 to 79    Dermatology: " "N/A  Ophthalmologist: Regular checkups pursued  Dentist: Regular checkups pursued with appropriate dental hygiene    Tobacco Use: Low Risk  (6/13/2025)    Patient History     Smoking Tobacco Use: Never     Smokeless Tobacco Use: Never     Passive Exposure: Not on file       Social History     Substance and Sexual Activity   Alcohol Use Never        Social History     Substance and Sexual Activity   Drug Use Never        Diet/Physical activity: Fair but improving diet, limited physical activity planning to improve    Sexual Health: Taking contraception for purposes of menstrual regulation, not currently sexually active, not attempting pregnancy   Menopause: No  Menstrual Cycles: Yes, last menstrual cycle: Within the last month    Depression: PHQ-2 Depression Screening  PHQ-9 Total Score:  0      Objective     Physical Exam:  Vital Signs:   Vitals:    06/13/25 0953   BP: 140/100   BP Location: Left arm   Patient Position: Sitting   Cuff Size: Adult   Pulse: 71   Resp: 18   Temp: 98.2 °F (36.8 °C)   TempSrc: Temporal   SpO2: 99%   Weight: 128 kg (283 lb)   Height: 170.2 cm (67\")     Facility age limit for growth %vince is 20 years.  Body mass index is 44.32 kg/m².     Physical Exam  Vitals and nursing note reviewed.   Constitutional:       General: She is not in acute distress.     Appearance: Normal appearance. She is obese. She is not ill-appearing.      Comments: Pleasant healthy alert and oriented, NAD, hard of hearing wearing hearing aids, BMI 44.3 representing a 3 pound weight gain in the last 3 months   HENT:      Head: Normocephalic and atraumatic.      Right Ear: Tympanic membrane, ear canal and external ear normal.      Left Ear: Tympanic membrane, ear canal and external ear normal.      Ears:      Comments: Congenital hearing deficit with bilateral hearing aids      Nose: Nose normal. No congestion or rhinorrhea.      Mouth/Throat:      Mouth: Mucous membranes are moist.      Pharynx: Oropharynx is clear.     "  Comments: Good dentition  Eyes:      Extraocular Movements: Extraocular movements intact.      Conjunctiva/sclera: Conjunctivae normal.      Pupils: Pupils are equal, round, and reactive to light.   Neck:      Vascular: No carotid bruit.      Comments: No periclavicular or axillary or inguinal adenopathy  Cardiovascular:      Rate and Rhythm: Normal rate and regular rhythm.      Pulses: Normal pulses.      Heart sounds: Normal heart sounds. No murmur heard.     No friction rub. No gallop.      Comments: 2+ carotids without bruits, 2+ radial pulses, 2+ femoral pulses without bruits, 2+ bipedal pulses with good perfusion and no dependent edema  Pulmonary:      Effort: Pulmonary effort is normal. No respiratory distress.      Breath sounds: Normal breath sounds.      Comments: No cough  Chest:   Breasts:     Joseph Score is 5.      Breasts are symmetrical.      Right: Normal. No swelling, bleeding, inverted nipple, mass, nipple discharge, skin change or tenderness.      Left: Normal. No swelling, bleeding, inverted nipple, mass, nipple discharge, skin change or tenderness.   Abdominal:      General: Bowel sounds are normal. There is no distension.      Palpations: Abdomen is soft. There is no mass.      Tenderness: There is no abdominal tenderness. There is no guarding or rebound.      Hernia: No hernia is present.      Comments: Nontender nondistended with no organomegaly or masses   Genitourinary:     Comments:  exam deferred today  Musculoskeletal:         General: No swelling, tenderness, deformity or signs of injury. Normal range of motion.      Cervical back: Normal range of motion and neck supple. No rigidity or tenderness.      Right lower leg: No edema.      Left lower leg: No edema.   Lymphadenopathy:      Cervical: No cervical adenopathy.      Upper Body:      Right upper body: No supraclavicular or axillary adenopathy.      Left upper body: No supraclavicular or axillary adenopathy.   Skin:     General:  Skin is warm and dry.      Capillary Refill: Capillary refill takes less than 2 seconds.      Findings: No lesion or rash.   Neurological:      General: No focal deficit present.      Mental Status: She is alert and oriented to person, place, and time. Mental status is at baseline.      Cranial Nerves: Cranial nerve deficit present.      Sensory: No sensory deficit.      Motor: No weakness.      Coordination: Coordination normal.      Gait: Gait normal.      Comments: Congenital deafness with hearing aids, bipedal exam with normal sensation of both feet fine touch vibration and pinprick with motor exam normal   Psychiatric:         Mood and Affect: Mood normal.         Behavior: Behavior normal.         Thought Content: Thought content normal.         Judgment: Judgment normal.     Diabetic Foot Exam Performed and Monofilament Test Performed     POCT Results (if applicable);   Results for orders placed or performed in visit on 06/13/25   POC Glycosylated Hemoglobin (Hb A1C)    Collection Time: 06/13/25 10:16 AM    Specimen: Blood   Result Value Ref Range    Hemoglobin A1C 6.2 (A) 4.5 - 5.7 %    Lot Number [id number]     Expiration Date 02/17/2027    POC Glucose, Blood    Collection Time: 06/13/25 10:16 AM    Specimen: Blood   Result Value Ref Range    Glucose 101 70 - 130 mg/dL    Lot Number [id number]     Expiration Date 09/11/2025    POC Albumin/Creatinine Ratio Urine    Collection Time: 06/13/25 10:59 AM    Specimen: Urine   Result Value Ref Range    POC ALBUMIN, URINE 150 mg/L    POC CREATININE, URINE 300 mg/dL    POC Urine Albumin Creatinine Ratio  <30    Lot Number [id number]     Expiration Date 08/09/2026           ECG 12 Lead    Date/Time: 6/13/2025 10:07 AM  Performed by: Danny Boss MD    Authorized by: Danny Boss MD  Comparison: compared with previous ECG from 12/12/2023  Similar to previous ECG  Comparison to previous ECG: Normal sinus rhythm, mild baseline artifact, minimal T wave  flattening in leads III and aVF, no significant change versus previous EKG, no abnormalities noted otherwise          Assessment / Plan      Assessment/Plan:   Diagnoses and all orders for this visit:    1. Medicare annual wellness visit, subsequent (Primary)    2. Encounter for general adult medical examination with abnormal findings  Assessment & Plan:  29-year-old female presenting for Medicare wellness visit and complete physical with specific health issues being addressed as detailed below, health maintenance includes follow-up scheduled with gynecology next week, all standard vaccinations up-to-date, EKG today unremarkable, updating screening labs.  Follow-up in 1 month to review clinical response to the initiation of phentermine for her obesity, as well as to follow-up her blood pressure, repeat UACR, and as needed in the interim    Orders:  -     TSH Rfx On Abnormal To Free T4; Future  -     Comprehensive Metabolic Panel; Future  -     Lipid Panel; Future  -     Cancel: Hemoglobin A1c; Future  -     Vitamin D,25-Hydroxy; Future  -     UA / M With / Rflx Culture([facility] ONLY) - Urine, Clean Catch; Future  -     Cancel: Microalbumin / Creatinine Urine Ratio - Urine, Clean Catch; Future  -     POC Glycosylated Hemoglobin (Hb A1C)  -     POC Glucose, Blood  -     POC Albumin/Creatinine Ratio Urine  -     UA / M With / Rflx Culture([facility] ONLY) - Urine, Clean Catch  -     Vitamin D,25-Hydroxy  -     Lipid Panel  -     Comprehensive Metabolic Panel  -     TSH Rfx On Abnormal To Free T4    3. Prediabetes  Assessment & Plan:  Some improvement in her glycemic control with a hemoglobin A1c today of 6.2% versus 6.3% in 12/2024.  She does attempt to improve her lifestyle with diet and exercise.  Unfortunately GLP-1 agonist is not a financially viable option at this stage.  Attempting weight loss with the addition of phentermine.    Orders:  -     POC Glycosylated Hemoglobin (Hb A1C)  -     POC Glucose, Blood  -     POC  Albumin/Creatinine Ratio Urine    4. Microalbuminuria  Assessment & Plan:  UACR today indicative of microalbuminuria.  Does have history of prediabetes likely the cause.  Will repeat testing at her follow-up visit in 3 months.  She is on an ACE inhibitor.      5. Primary hypertension  Assessment & Plan:  EKG today stable and unremarkable.  Stage II elevation acutely, chronic control historically has been satisfactory though not checking blood pressures lately.  Advised to start monitoring her blood pressures regularly taking lisinopril 10 mg daily.  Will also monitor closely given she is being prescribed phentermine for weight loss, repeating her blood pressure at her follow-up visit next month.    Orders:  -     ECG 12 Lead  -     Comprehensive Metabolic Panel; Future  -     UA / M With / Rflx Culture([facility] ONLY) - Urine, Clean Catch; Future  -     UA / M With / Rflx Culture([facility] ONLY) - Urine, Clean Catch  -     Comprehensive Metabolic Panel    6. Mixed hyperlipidemia  Assessment & Plan:  Currently taking atorvastatin 20 mg nightly.  Update lipid profile with attempts to improve lifestyle with diet and exercise     Orders:  -     Lipid Panel; Future  -     Lipid Panel    7. Vitamin D deficiency  Assessment & Plan:  Currently taking vitamin D 2000 IU daily.  Update level.    Orders:  -     Vitamin D,25-Hydroxy; Future  -     Vitamin D,25-Hydroxy    8. Class 3 severe obesity due to excess calories without serious comorbidity with body mass index (BMI) of 40.0 to 44.9 in adult  Assessment & Plan:  3 pound weight gain in the last 3 months, admittedly having been very sedentary with poor diet.  She is attempting to improve both parameters.  We also discussed medication weight loss options, and have agreed to initiate phentermine 15 mg daily with side effect profile potential discussed, and specifically monitoring her blood pressure.  Follow-up in 1 month for review and make further recommendations accordingly.   Did discuss ultimately concept of titration off of the phentermine and potentially the addition of Topamax in the future    Orders:  -     phentermine 15 MG capsule; Take 1 capsule by mouth Every Morning.  Dispense: 30 capsule; Refill: 0    9. Congenital deafness  Assessment & Plan:  Wears hearing aids bilaterally getting satisfactory clinical response.      10. Seasonal allergic rhinitis due to pollen  Assessment & Plan:  Maintaining satisfactorily well using Flonase and loratadine with prescription refill given for loratadine    Orders:  -     loratadine (CLARITIN) 10 MG tablet; Take 1 tablet by mouth Daily.  Dispense: 90 tablet; Refill: 3    11. Polycystic ovarian syndrome  Assessment & Plan:  Followed regularly with gynecology with appointment next week, on oral contraceptives.      12. Family history of breast cancer  Assessment & Plan:  Mother with breast cancer diagnosed age 45.  Initiate screening mammogram in this patient 10 years prior at age 36      13. Screening for thyroid disorder  -     TSH Rfx On Abnormal To Free T4; Future  -     TSH Rfx On Abnormal To Free T4         Healthcare Maintenance:  Counseling provided based on age appropriate USPSTF guidelines.  Class 3 Severe Obesity (BMI >=40). Obesity-related health conditions include the following: hypertension, diabetes mellitus, and dyslipidemias. Obesity is unchanged. BMI is is above average; BMI management plan is completed. We discussed portion control, increasing exercise, and pharmacologic options including phentermine and Topamax, unable to afford GLP-1 agonist.    Rosa Craig voices understanding and acceptance of this advice and will call back with any further questions or concerns. AVS with preventive healthcare tips printed for patient.     Vaccine Counseling:      Follow Up:   Return in about 1 month (around 7/13/2025).      At [facility], we believe that sharing information builds trust and better relationships. You are receiving  this note because you recently visited [facility]. It is possible you will see health information before a provider has talked with you about it. This kind of information can be easy to misunderstand. To help you fully understand what it means for your health, we urge you to discuss this note with your provider.    Danny Boss MD  [facility]

## 2025-06-13 NOTE — ASSESSMENT & PLAN NOTE
UACR today indicative of microalbuminuria.  Does have history of prediabetes likely the cause.  Will repeat testing at her follow-up visit in 3 months.  She is on an ACE inhibitor.

## 2025-06-13 NOTE — ASSESSMENT & PLAN NOTE
EKG today stable and unremarkable.  Stage II elevation acutely, chronic control historically has been satisfactory though not checking blood pressures lately.  Advised to start monitoring her blood pressures regularly taking lisinopril 10 mg daily.  Will also monitor closely given she is being prescribed phentermine for weight loss, repeating her blood pressure at her follow-up visit next month.

## 2025-06-13 NOTE — ASSESSMENT & PLAN NOTE
Maintaining satisfactorily well using Flonase and loratadine with prescription refill given for loratadine

## 2025-06-14 LAB
25(OH)D3+25(OH)D2 SERPL-MCNC: 23.8 NG/ML (ref 30–100)
ALBUMIN SERPL-MCNC: 4.3 G/DL (ref 4–5)
ALP SERPL-CCNC: 90 IU/L (ref 44–121)
ALT SERPL-CCNC: 13 IU/L (ref 0–32)
AST SERPL-CCNC: 13 IU/L (ref 0–40)
BILIRUB SERPL-MCNC: 0.3 MG/DL (ref 0–1.2)
BUN SERPL-MCNC: 9 MG/DL (ref 6–20)
BUN/CREAT SERPL: 13 (ref 9–23)
CALCIUM SERPL-MCNC: 9.3 MG/DL (ref 8.7–10.2)
CHLORIDE SERPL-SCNC: 102 MMOL/L (ref 96–106)
CHOLEST SERPL-MCNC: 174 MG/DL (ref 100–199)
CO2 SERPL-SCNC: 20 MMOL/L (ref 20–29)
CREAT SERPL-MCNC: 0.71 MG/DL (ref 0.57–1)
EGFRCR SERPLBLD CKD-EPI 2021: 119 ML/MIN/1.73
GLOBULIN SER CALC-MCNC: 2.9 G/DL (ref 1.5–4.5)
GLUCOSE SERPL-MCNC: 91 MG/DL (ref 70–99)
HDLC SERPL-MCNC: 46 MG/DL
LDLC SERPL CALC-MCNC: 103 MG/DL (ref 0–99)
POTASSIUM SERPL-SCNC: 4.4 MMOL/L (ref 3.5–5.2)
PROT SERPL-MCNC: 7.2 G/DL (ref 6–8.5)
SODIUM SERPL-SCNC: 140 MMOL/L (ref 134–144)
TRIGL SERPL-MCNC: 143 MG/DL (ref 0–149)
TSH SERPL DL<=0.005 MIU/L-ACNC: 1.75 UIU/ML (ref 0.45–4.5)
VLDLC SERPL CALC-MCNC: 25 MG/DL (ref 5–40)

## 2025-06-15 LAB
APPEARANCE UR: CLEAR
BACTERIA #/AREA URNS HPF: ABNORMAL /[HPF]
BACTERIA UR CULT: NORMAL
BACTERIA UR CULT: NORMAL
BILIRUB UR QL STRIP: NEGATIVE
CASTS URNS QL MICRO: ABNORMAL /LPF
COLOR UR: YELLOW
EPI CELLS #/AREA URNS HPF: ABNORMAL /HPF (ref 0–10)
GLUCOSE UR QL STRIP: NEGATIVE
HGB UR QL STRIP: NEGATIVE
KETONES UR QL STRIP: NEGATIVE
LEUKOCYTE ESTERASE UR QL STRIP: NEGATIVE
MICRO URNS: NORMAL
MICRO URNS: NORMAL
NITRITE UR QL STRIP: NEGATIVE
PH UR STRIP: 5.5 [PH] (ref 5–7.5)
PROT UR QL STRIP: NORMAL
RBC #/AREA URNS HPF: ABNORMAL /HPF (ref 0–2)
SP GR UR STRIP: 1.03 (ref 1–1.03)
URINALYSIS REFLEX: NORMAL
UROBILINOGEN UR STRIP-MCNC: 0.2 MG/DL (ref 0.2–1)
WBC #/AREA URNS HPF: ABNORMAL /HPF (ref 0–5)

## 2025-06-16 ENCOUNTER — RESULTS FOLLOW-UP (OUTPATIENT)
Dept: FAMILY MEDICINE CLINIC | Facility: CLINIC | Age: 30
End: 2025-06-16
Payer: MEDICARE

## 2025-06-16 ENCOUNTER — OFFICE VISIT (OUTPATIENT)
Dept: OBSTETRICS AND GYNECOLOGY | Facility: CLINIC | Age: 30
End: 2025-06-16
Payer: MEDICARE

## 2025-06-16 VITALS
HEIGHT: 67 IN | SYSTOLIC BLOOD PRESSURE: 128 MMHG | WEIGHT: 281 LBS | DIASTOLIC BLOOD PRESSURE: 80 MMHG | BODY MASS INDEX: 44.1 KG/M2

## 2025-06-16 DIAGNOSIS — E55.9 VITAMIN D DEFICIENCY: Primary | ICD-10-CM

## 2025-06-16 DIAGNOSIS — Z01.419 WOMEN'S ANNUAL ROUTINE GYNECOLOGICAL EXAMINATION: Primary | ICD-10-CM

## 2025-06-16 RX ORDER — NORGESTIMATE AND ETHINYL ESTRADIOL 0.25-0.035
1 KIT ORAL DAILY
Qty: 84 TABLET | Refills: 4 | Status: SHIPPED | OUTPATIENT
Start: 2025-06-16

## 2025-06-16 RX ORDER — ERGOCALCIFEROL 1.25 MG/1
50000 CAPSULE, LIQUID FILLED ORAL WEEKLY
Qty: 13 CAPSULE | Refills: 3 | Status: SHIPPED | OUTPATIENT
Start: 2025-06-16

## 2025-06-16 NOTE — PROGRESS NOTES
Gynecologic Annual Exam Note        annual        Subjective     HPI  Rosa Craig is a 29 y.o.  female who presents for annual well woman exam as a established patient. There were no changes to her medical or surgical history since her last visit.. Patient's last menstrual period was 2025 (approximate). Her periods occur every 28-30 days, lasting 7 days.  The flow is light. She denies dysmenorrhea. Marital Status: single.  She is not currently sexually active though she has been in the past a long time ago. STD testing recommendations have been explained to the patient and she does not desire STD testing.    The patient would like to discuss the following complaints today: none    Additional OB/GYN History   contraceptive methods: OCP (estrogen/progesterone)  Desires to: continue contraception  Thromboembolic Disease: none  History of migraines: no  Age of menarche: 12    History of STD: no    Last Pap : 2024. Results: non-diagnostic . HPV: negative.   Last Completed Pap Smear            Upcoming       PAP SMEAR (Every 3 Years) Next due on 2024  LIQUID-BASED PAP SMEAR WITH HPV GENOTYPING REGARDLESS OF INTERPRETATION ([facility])    2020  Patient-Reported (Performed Externally) - Patient report normal Pap smear per GYN    2020  Patient-Reported (Performed Externally) - Normal reported by patient, Dr. Olsen                             History of abnormal Pap smear: no  Gardasil status:completed  Family history of uterine, colon, breast, or ovarian cancer: yes - Mother, Maternal Aunt and Paternal Aunt- Breast Cancer  Performs monthly Self-Breast Exam: no  Exercises Regularly:yes  Feelings of Anxiety or Depression: no  Tobacco Usage?: No       Current Outpatient Medications:     atorvastatin (LIPITOR) 20 MG tablet, Take 1 tablet by mouth every night at bedtime., Disp: 90 tablet, Rfl: 1    lisinopril (PRINIVIL,ZESTRIL) 10 MG tablet, Take 1 tablet by mouth  Daily., Disp: 90 tablet, Rfl: 3    loratadine (CLARITIN) 10 MG tablet, Take 1 tablet by mouth Daily., Disp: 90 tablet, Rfl: 3    norgestimate-ethinyl estradiol ([place]) 0.25-35 MG-MCG per tablet, Take 1 tablet by mouth Daily., Disp: 84 tablet, Rfl: 4    phentermine 15 MG capsule, Take 1 capsule by mouth Every Morning., Disp: 30 capsule, Rfl: 0    Blood Glucose Monitoring Suppl (ONE TOUCH ULTRA 2) w/Device kit, 1 Units Daily., Disp: 1 each, Rfl: 0    Glucose Blood (Blood Glucose Test Strips 333) strip, 1 Units by In Vitro route Daily., Disp: 100 strip, Rfl: 3     Patient is requesting refills of OCP.    OB History          0    Para   0    Term   0       0    AB   0    Living   0         SAB   0    IAB   0    Ectopic   0    Molar   0    Multiple   0    Live Births   0                Health Maintenance   Topic Date Due    Hepatitis B (3 of 3 - 3-dose series) 2007    COVID-19 Vaccine (4 - 2024-25 season) 2024    DIABETIC EYE EXAM  2024    Annual Gynecologic Pelvic and Breast Exam  2025    INFLUENZA VACCINE  2025    HEMOGLOBIN A1C  2025    ANNUAL WELLNESS VISIT  2026    DIABETIC FOOT EXAM  2026    LIPID PANEL  2026    URINE MICROALBUMIN-CREATININE RATIO (uACR)  2026    PAP SMEAR  2027    TDAP/TD VACCINES (2 - Td or Tdap) 2032    HEPATITIS C SCREENING  Completed    Pneumococcal Vaccine 0-49  Completed    CHLAMYDIA SCREENING  Discontinued       Past Medical History:   Diagnosis Date    Acute pharyngitis     Allergic     Allergic rhinitis due to pollen     Anxiety     Asthma     Congenital sensorineural hearing loss     Depression     Dysthymic disorder     Elevated blood pressure reading without diagnosis of hypertension     Ganglion cyst     Generalized anxiety disorder     Microalbuminuria     Mixed hyperlipidemia     Obesity     Onychomycosis     bilateral great toenail    Other fatigue     Other proteinuria     Polycystic ovarian  "syndrome     Prediabetes     Proteinuria     Tinea unguium     Viral infection     Visual impairment     Vitamin D deficiency     Vitamin D deficiency         Past Surgical History:   Procedure Laterality Date    EXTERNAL EAR SURGERY Bilateral     WISDOM TOOTH EXTRACTION  2015       The additional following portions of the patient's history were reviewed and updated as appropriate: allergies, current medications, past family history, past medical history, past social history, past surgical history, and problem list.    Review of Systems   Constitutional: Negative.    Respiratory: Negative.     Cardiovascular: Negative.    Gastrointestinal: Negative.    Genitourinary: Negative.    Psychiatric/Behavioral: Negative.           I have reviewed and agree with the HPI, ROS, and historical information as entered above. Jaqueline GARZA Tomeka, APRN          Objective   /80   Ht 170.2 cm (67\")   Wt 127 kg (281 lb)   LMP 06/09/2025 (Approximate)   BMI 44.01 kg/m²     Physical Exam  Constitutional:       Appearance: Normal appearance.   Neck:      Thyroid: No thyroid mass or thyromegaly.   Pulmonary:      Effort: Pulmonary effort is normal.   Chest:      Chest wall: No mass.   Breasts:     Right: Normal. No inverted nipple, mass, nipple discharge or skin change.      Left: Normal. No inverted nipple, mass, nipple discharge or skin change.   Abdominal:      General: There is no distension.      Palpations: Abdomen is soft. There is no mass.      Tenderness: There is no abdominal tenderness.      Hernia: No hernia is present.   Genitourinary:     General: Normal vulva.      Labia:         Right: No rash.         Left: No rash.       Vagina: Normal.      Cervix: No cervical motion tenderness or lesion.      Uterus: Normal.       Adnexa: Right adnexa normal and left adnexa normal.        Right: No mass or tenderness.          Left: No mass or tenderness.     Neurological:      Mental Status: She is alert.            Assessment " and Plan    Problem List Items Addressed This Visit    None  Visit Diagnoses         Women's annual routine gynecological examination    -  Primary          Happy on TAMIKO, taking as prescribed.     GYN annual well woman exam.   Reviewed pap guidelines.   OCP's/Vaginal Ring - Discussed side effects of nausea, BTB, headaches, breast tenderness and slight weight gain in the first three cycles.  Understands risks of blood clots, stroke, and theoretical risk of breast cancer.  Denies family history of blood clots.  Reviewed monthly self breast exams.  Instructed to call with lumps, pain, or breast discharge.    Return in about 1 year (around 6/16/2026) for Annual physical.    Jaqueline Eckert, APRN  06/16/2025

## 2025-06-16 NOTE — TELEPHONE ENCOUNTER
Phone conversation with patient regarding lab testing from 6/13/2025 as follows:    TSH, CMP, lipid profile all satisfactory  Urinalysis with microscopic revealing many bacteria, 5 WBCs, 0-2 RBCs, with culture growing 50,000-100,000 colonies of mixed urogenital mary indicative of contamination  Vitamin D low at 23.8    Assessment/plan:  1.  Vitamin D deficiency.  Patient confirms she is taking her vitamin D 5000 IU once daily.  Will plan on switching to 50,000 IU once weekly, and repeat level at her follow-up visit in 6 months  2.  Remainder of laboratory testing satisfactory

## 2025-06-18 LAB — REF LAB TEST METHOD: NORMAL

## 2025-07-15 ENCOUNTER — OFFICE VISIT (OUTPATIENT)
Dept: FAMILY MEDICINE CLINIC | Facility: CLINIC | Age: 30
End: 2025-07-15
Payer: MEDICARE

## 2025-07-15 VITALS
HEIGHT: 67 IN | DIASTOLIC BLOOD PRESSURE: 83 MMHG | WEIGHT: 274 LBS | BODY MASS INDEX: 43 KG/M2 | SYSTOLIC BLOOD PRESSURE: 124 MMHG | HEART RATE: 82 BPM | OXYGEN SATURATION: 97 % | TEMPERATURE: 97.9 F

## 2025-07-15 DIAGNOSIS — R73.03 PREDIABETES: ICD-10-CM

## 2025-07-15 DIAGNOSIS — R80.9 MICROALBUMINURIA: ICD-10-CM

## 2025-07-15 DIAGNOSIS — I10 PRIMARY HYPERTENSION: ICD-10-CM

## 2025-07-15 DIAGNOSIS — E66.813 CLASS 3 SEVERE OBESITY DUE TO EXCESS CALORIES WITHOUT SERIOUS COMORBIDITY WITH BODY MASS INDEX (BMI) OF 40.0 TO 44.9 IN ADULT: Primary | ICD-10-CM

## 2025-07-15 PROCEDURE — 3079F DIAST BP 80-89 MM HG: CPT | Performed by: INTERNAL MEDICINE

## 2025-07-15 PROCEDURE — 99214 OFFICE O/P EST MOD 30 MIN: CPT | Performed by: INTERNAL MEDICINE

## 2025-07-15 PROCEDURE — G2211 COMPLEX E/M VISIT ADD ON: HCPCS | Performed by: INTERNAL MEDICINE

## 2025-07-15 PROCEDURE — 1160F RVW MEDS BY RX/DR IN RCRD: CPT | Performed by: INTERNAL MEDICINE

## 2025-07-15 PROCEDURE — 3044F HG A1C LEVEL LT 7.0%: CPT | Performed by: INTERNAL MEDICINE

## 2025-07-15 PROCEDURE — 1126F AMNT PAIN NOTED NONE PRSNT: CPT | Performed by: INTERNAL MEDICINE

## 2025-07-15 PROCEDURE — 1159F MED LIST DOCD IN RCRD: CPT | Performed by: INTERNAL MEDICINE

## 2025-07-15 PROCEDURE — 3074F SYST BP LT 130 MM HG: CPT | Performed by: INTERNAL MEDICINE

## 2025-07-15 RX ORDER — PHENTERMINE HYDROCHLORIDE 15 MG/1
15 CAPSULE ORAL EVERY MORNING
Qty: 30 CAPSULE | Refills: 0 | Status: SHIPPED | OUTPATIENT
Start: 2025-07-15

## 2025-07-15 NOTE — ASSESSMENT & PLAN NOTE
Abnormal UACR indicative of microalbuminuria in 6/2025, history of prediabetes as likely cause.  Repeat testing in 9/2025.  Does continue on an ACE inhibitor.

## 2025-07-15 NOTE — PROGRESS NOTES
Follow Up Office Visit      Date: 07/15/2025   Patient Name: Rosa Craig  : 1995   MRN: [id number]     Chief Complaint:    Chief Complaint   Patient presents with    Weight Management       History of Present Illness: Rosa Craig is a 29 y.o. female who is here today for follow-up of her obesity for which she was started on phentermine 15 mg daily, noting moderate appetite suppression with consequent decreased portion sizes of food, improving her diet with more vegetables as well as some fruit intake and reduction in junk food and fast food, drinking primarily water with occasional low sugar juice, slight increased physical activity but still significant room for improvement.  Has lost 7 pounds of weight in the last month.  Does have hypertension taking lisinopril 10 mg daily with blood pressures averaging 120/80, denying chest pains palpitations dyspnea dizziness or edema.  Also prediabetic with hemoglobin A1c most recently 6.2% in 2025, again attempting lifestyle changes.  Associate microalbuminuria in 2025, with plan to repeat in 3 months correlating to 2025.  Since last visit patient also underwent routine gynecological evaluation including Pap smear which was normal.    Subjective      Review of Systems:   Review of Systems    I have reviewed the patients family history, social history, past medical history, past surgical history and have updated it as appropriate.     Medications:     Current Outpatient Medications:     atorvastatin (LIPITOR) 20 MG tablet, Take 1 tablet by mouth every night at bedtime., Disp: 90 tablet, Rfl: 1    Blood Glucose Monitoring Suppl (ONE TOUCH ULTRA 2) w/Device kit, 1 Units Daily., Disp: 1 each, Rfl: 0    Glucose Blood (Blood Glucose Test Strips 333) strip, 1 Units by In Vitro route Daily., Disp: 100 strip, Rfl: 3    lisinopril (PRINIVIL,ZESTRIL) 10 MG tablet, Take 1 tablet by mouth Daily., Disp: 90 tablet, Rfl: 3    loratadine (CLARITIN) 10 MG tablet, Take 1  "tablet by mouth Daily., Disp: 90 tablet, Rfl: 3    norgestimate-ethinyl estradiol ([place]) 0.25-35 MG-MCG per tablet, Take 1 tablet by mouth Daily., Disp: 84 tablet, Rfl: 4    phentermine 15 MG capsule, Take 1 capsule by mouth Every Morning., Disp: 30 capsule, Rfl: 0    vitamin D (ERGOCALCIFEROL) 1.25 MG (13198 UT) capsule capsule, Take 1 capsule by mouth 1 (One) Time Per Week., Disp: 13 capsule, Rfl: 3    Allergies:   Allergies   Allergen Reactions    Zofran [Ondansetron] Other (See Comments)     Choke, increasing nausea    Elemental Sulfur Rash    Sulfa Antibiotics Rash       Objective     Physical Exam: Please see above  Vital Signs:   Vitals:    07/15/25 1138   BP: 124/83   BP Location: Left arm   Patient Position: Sitting   Cuff Size: Adult   Pulse: 82   Temp: 97.9 °F (36.6 °C)   TempSrc: Temporal   SpO2: 97%   Weight: 124 kg (274 lb)   Height: 170.2 cm (67\")     Body mass index is 42.91 kg/m².          Physical Exam  Constitutional:       Appearance: Normal appearance. She is obese.      Comments: Pleasant, healthy, NAD, BMI 42.9 representing a 7 pound weight loss in the last month   Cardiovascular:      Rate and Rhythm: Normal rate and regular rhythm.      Heart sounds: Normal heart sounds. No murmur heard.     No friction rub. No gallop.   Pulmonary:      Effort: Pulmonary effort is normal. No respiratory distress.      Breath sounds: Normal breath sounds.   Neurological:      Mental Status: She is oriented to person, place, and time. Mental status is at baseline.      Comments: Chronically hard of hearing with hearing aids otherwise unremarkable   Psychiatric:         Mood and Affect: Mood normal.         Procedures    Results:   Labs:   Hemoglobin A1C   Date Value Ref Range Status   06/13/2025 6.2 (A) 4.5 - 5.7 % Final     TSH   Date Value Ref Range Status   06/13/2025 1.750 0.450 - 4.500 uIU/mL Final        POCT Results (if applicable):   Results for orders placed or performed in visit on 06/16/25 "   LIQUID-BASED PAP SMEAR WITH HPV GENOTYPING IF ASCUS ([facility])    Collection Time: 25 11:14 AM    Specimen: Cervix; ThinPrep Vial   Result Value Ref Range    Reference Lab Report       [facility]  [address] [place] [address]  Phone: [phone] or [phone]  Fax: [phone]  Danny Devries M.D., Medical Director    PATIENT NAME                                     LABORATORY NO.  JESSIE BELL                                   [id number]  [id number]                                 AGE                    SEX   SSN              CLIENT REF #  [facility]                    29        1995      F     [id number]      [id number]    [facility] CECILE GONZALEZ                            REQUESTING M.D.           ATTENDING [place] [address]                       WALTER NAVARROY  DATE COLLECTED            DATE RECEIVED          DATE REPORTED  2025    ThinPrep Pap with [facility]    DIAGNOSIS:  Negative for intraepithelial lesion or malignancy    Multiple factors can influence accuracy of Pap tests; therefore, screening  at  regular intervals is necessary for early cancer detection.    Professional interpretation rendered by Boaz Naylor M.D. at [facility],  [place], [address] [place] [address].  SPECIMEN ADEQUACY:  SATISFACTORY FOR EVALUATION  Transformation zone is present.  SOURCE OF SPECIMEN:       CERVICAL  SLIDES:  1  CLINICAL HISTORY:  Women's annual routine gynecological examination      CYTOTECHNOLOGIST:             BRETT BANERJEE (ASCP)                                      REVIEWED, DIAGNOSED AND  ELECTRONICALLY SIGNED BY:      Boaz Naylor M.D.    CPT CODES:  07790         Assessment / Plan      Assessment/Plan:   Diagnoses and all orders for this visit:    1. Class 3 severe obesity due to excess calories without serious comorbidity  with body mass index (BMI) of 40.0 to 44.9 in adult (Primary)  Assessment & Plan:  History of phentermine 15 mg daily 1 month ago in 6/2025 having noted a 7 pound weight loss, good appetite suppression, improving her diet and physical activity.  Continue current regimen and reassessing in another month.  We did discuss if she starts to level off on her weight loss we will then increase the dose of phentermine ultimately consider the addition of Topamax.  Follow-up in 1 month for review.    Orders:  -     phentermine 15 MG capsule; Take 1 capsule by mouth Every Morning.  Dispense: 30 capsule; Refill: 0    2. Prediabetes  Assessment & Plan:  Hemoglobin A1c 6.2% in 6/2025, patient pursuing healthy lifestyle changes with diet and exercise.  Repeat hemoglobin A1c 6 months from last testing.      3. Primary hypertension  Assessment & Plan:  Maintaining very satisfactory blood pressure control acutely as well as by history chronically taking lisinopril 10 mg daily.  Continue current regimen with monitoring, continue healthy lifestyle changes including diet avoiding added salt, regular physical activity and attempt at weight loss.        4. Microalbuminuria  Assessment & Plan:  Abnormal UACR indicative of microalbuminuria in 6/2025, history of prediabetes as likely cause.  Repeat testing in 9/2025.  Does continue on an ACE inhibitor.          Vaccine Counseling:      Follow Up:   Return in about 1 month (around 8/15/2025) for Recheck.      At [facility], we believe that sharing information builds trust and better relationships. You are receiving this note because you recently visited [facility]. It is possible you will see health information before a provider has talked with you about it. This kind of information can be easy to misunderstand. To help you fully understand what it means for your health, we urge you to discuss this note with your provider.    Danny Boss MD  [facility]

## 2025-07-15 NOTE — ASSESSMENT & PLAN NOTE
Hemoglobin A1c 6.2% in 6/2025, patient pursuing healthy lifestyle changes with diet and exercise.  Repeat hemoglobin A1c 6 months from last testing.

## 2025-07-15 NOTE — ASSESSMENT & PLAN NOTE
Maintaining very satisfactory blood pressure control acutely as well as by history chronically taking lisinopril 10 mg daily.  Continue current regimen with monitoring, continue healthy lifestyle changes including diet avoiding added salt, regular physical activity and attempt at weight loss.

## 2025-07-15 NOTE — ASSESSMENT & PLAN NOTE
History of phentermine 15 mg daily 1 month ago in 6/2025 having noted a 7 pound weight loss, good appetite suppression, improving her diet and physical activity.  Continue current regimen and reassessing in another month.  We did discuss if she starts to level off on her weight loss we will then increase the dose of phentermine ultimately consider the addition of Topamax.  Follow-up in 1 month for review.

## 2025-07-28 ENCOUNTER — OFFICE VISIT (OUTPATIENT)
Dept: FAMILY MEDICINE CLINIC | Facility: CLINIC | Age: 30
End: 2025-07-28
Payer: MEDICARE

## 2025-07-28 VITALS
OXYGEN SATURATION: 98 % | BODY MASS INDEX: 42.88 KG/M2 | TEMPERATURE: 97.9 F | WEIGHT: 273.2 LBS | DIASTOLIC BLOOD PRESSURE: 83 MMHG | HEIGHT: 67 IN | SYSTOLIC BLOOD PRESSURE: 120 MMHG | HEART RATE: 78 BPM

## 2025-07-28 DIAGNOSIS — J06.9 VIRAL UPPER RESPIRATORY TRACT INFECTION: Primary | ICD-10-CM

## 2025-07-28 PROCEDURE — 3074F SYST BP LT 130 MM HG: CPT | Performed by: INTERNAL MEDICINE

## 2025-07-28 PROCEDURE — 3079F DIAST BP 80-89 MM HG: CPT | Performed by: INTERNAL MEDICINE

## 2025-07-28 PROCEDURE — 1160F RVW MEDS BY RX/DR IN RCRD: CPT | Performed by: INTERNAL MEDICINE

## 2025-07-28 PROCEDURE — 99213 OFFICE O/P EST LOW 20 MIN: CPT | Performed by: INTERNAL MEDICINE

## 2025-07-28 PROCEDURE — 3044F HG A1C LEVEL LT 7.0%: CPT | Performed by: INTERNAL MEDICINE

## 2025-07-28 PROCEDURE — G2211 COMPLEX E/M VISIT ADD ON: HCPCS | Performed by: INTERNAL MEDICINE

## 2025-07-28 PROCEDURE — 1126F AMNT PAIN NOTED NONE PRSNT: CPT | Performed by: INTERNAL MEDICINE

## 2025-07-28 PROCEDURE — 1159F MED LIST DOCD IN RCRD: CPT | Performed by: INTERNAL MEDICINE

## 2025-07-28 RX ORDER — PREDNISONE 20 MG/1
20 TABLET ORAL DAILY
Qty: 3 TABLET | Refills: 0 | Status: SHIPPED | OUTPATIENT
Start: 2025-07-28 | End: 2025-07-31

## 2025-07-28 RX ORDER — FLUTICASONE PROPIONATE 50 MCG
2 SPRAY, SUSPENSION (ML) NASAL DAILY
Qty: 16 G | Refills: 5 | Status: SHIPPED | OUTPATIENT
Start: 2025-07-28

## 2025-07-28 NOTE — ASSESSMENT & PLAN NOTE
Approximate 5-day history of a scratchy sore throat, intermittently having a sense of something caught in her throat, nasal congestion, postnasal drip, no headache, no fevers or chills, no cough or any respiratory distress, able to swallow without any difficulty.  Was seen in the ER yesterday with unremarkable evaluation, rapid strep negative, tentative plans to have referred her to ENT for consideration of laryngoscopy.  She really has no specific complaints at this time other than irritation of her throat.  She had normal transillumination of her sinuses to light, no fevers or chills, no palpable sinus tenderness, unlikely to represent an acute sinusitis at this time.  Also very unlikely that she has any sort of foreign body in her throat, symptoms likely related to irritation from postnasal drip.  Recommend continue Claritin, adding Flonase, brief course of prednisone, and if does develop cough Robitussin DM.  If she is not improving her symptoms in the next 2 to 3 days or for any significant worsening in the interim, advise accordingly

## 2025-07-28 NOTE — PROGRESS NOTES
Follow Up Office Visit      Date: 2025   Patient Name: Rosa Craig  : 1995   MRN: [id number]     Chief Complaint:    Chief Complaint   Patient presents with    Nasal Congestion     Sinus issues.went to ER       History of Present Illness: Rosa Craig is a 30 y.o. female who is here today for for evaluation of approximately 5 days of scratchy sore throat with sensation at times something is caught in her throat, no difficulty with swallowing or breathing, ear discomfort noted with swallowing, along with nasal congestion and postnasal drainage, no cough, no fevers or chills, no other acute problems..  Patient was seen in the emergency room yesterday given her complaint of something stuck in her throat, with investigations unremarkable.  Rapid strep was negative.  She was noted to swallow without any difficulty.  ER physician discussed with Dr. Karen Johnson of ENT for consideration of laryngoscopy as an outpatient.  ER record from visit at [facility] on 2025 was reviewed by me today in detail.    Subjective      Review of Systems:   Review of Systems    I have reviewed the patients family history, social history, past medical history, past surgical history and have updated it as appropriate.     Medications:     Current Outpatient Medications:     atorvastatin (LIPITOR) 20 MG tablet, Take 1 tablet by mouth every night at bedtime., Disp: 90 tablet, Rfl: 1    Blood Glucose Monitoring Suppl (ONE TOUCH ULTRA 2) w/Device kit, 1 Units Daily., Disp: 1 each, Rfl: 0    Glucose Blood (Blood Glucose Test Strips 333) strip, 1 Units by In Vitro route Daily., Disp: 100 strip, Rfl: 3    lisinopril (PRINIVIL,ZESTRIL) 10 MG tablet, Take 1 tablet by mouth Daily., Disp: 90 tablet, Rfl: 3    loratadine (CLARITIN) 10 MG tablet, Take 1 tablet by mouth Daily., Disp: 90 tablet, Rfl: 3    norgestimate-ethinyl estradiol ([place]) 0.25-35 MG-MCG per tablet, Take 1 tablet by mouth Daily., Disp: 84 tablet,  "Rfl: 4    phentermine 15 MG capsule, Take 1 capsule by mouth Every Morning., Disp: 30 capsule, Rfl: 0    vitamin D (ERGOCALCIFEROL) 1.25 MG (98280 UT) capsule capsule, Take 1 capsule by mouth 1 (One) Time Per Week., Disp: 13 capsule, Rfl: 3    fluticasone (FLONASE) 50 MCG/ACT nasal spray, Administer 2 sprays into the nostril(s) as directed by provider Daily., Disp: 16 g, Rfl: 5    predniSONE (DELTASONE) 20 MG tablet, Take 1 tablet by mouth Daily for 3 days., Disp: 3 tablet, Rfl: 0    Allergies:   Allergies   Allergen Reactions    Zofran [Ondansetron] Other (See Comments)     Choke, increasing nausea    Elemental Sulfur Rash    Sulfa Antibiotics Rash       Objective     Physical Exam: Please see above  Vital Signs:   Vitals:    07/28/25 1116   BP: 120/83   BP Location: Left arm   Patient Position: Sitting   Cuff Size: Adult   Pulse: 78   Temp: 97.9 °F (36.6 °C)   TempSrc: Temporal   SpO2: 98%   Weight: 124 kg (273 lb 3.2 oz)   Height: 170.2 cm (67\")     Body mass index is 42.79 kg/m².          Physical Exam  Constitutional:       General: She is not in acute distress.     Appearance: Normal appearance. She is obese. She is not ill-appearing.      Comments: Pleasant, healthy, NAD, BMI 42.7   HENT:      Right Ear: Tympanic membrane and ear canal normal.      Left Ear: Tympanic membrane and ear canal normal.      Ears:      Comments: Bilateral hearing aids in place which were removed from the exam, having congenital sensorineural hearing loss     Nose: Congestion present. No rhinorrhea.      Comments: Normal transillumination to light of both frontal and maxillary sinuses bilaterally, no sinus tenderness     Mouth/Throat:      Mouth: Mucous membranes are moist.      Pharynx: Posterior oropharyngeal erythema present. No oropharyngeal exudate.      Comments: Mild irritative posterior erythema with no exudate or petechiae, no visualized foreign body, no sign whatsoever of any breathing or swallowing difficulty  Neck:      " Comments: States the lower midline of the neck above the xiphoid process is slightly tender to palpation  Cardiovascular:      Rate and Rhythm: Normal rate and regular rhythm.      Heart sounds: Normal heart sounds. No murmur heard.     No friction rub. No gallop.   Pulmonary:      Effort: Pulmonary effort is normal. No respiratory distress.      Breath sounds: Normal breath sounds. No stridor. No wheezing, rhonchi or rales.   Musculoskeletal:      Cervical back: No rigidity or tenderness.   Lymphadenopathy:      Cervical: No cervical adenopathy.   Skin:     General: Skin is warm and dry.   Neurological:      Mental Status: She is alert and oriented to person, place, and time. Mental status is at baseline.      Comments: Chronic sensorineural hearing loss otherwise normal and nonfocal   Psychiatric:         Mood and Affect: Mood normal.         Procedures    Results:   Labs:   Hemoglobin A1C   Date Value Ref Range Status   2025 6.2 (A) 4.5 - 5.7 % Final     TSH   Date Value Ref Range Status   2025 1.750 0.450 - 4.500 uIU/mL Final        POCT Results (if applicable):   Results for orders placed or performed in visit on 25   LIQUID-BASED PAP SMEAR WITH HPV GENOTYPING IF ASCUS ([facility])    Collection Time: 25 11:14 AM    Specimen: Cervix; ThinPrep Vial   Result Value Ref Range    Reference Lab Report       [facility]  [address] [place] [address]  Phone: [phone] or [phone]  Fax: [phone]  Danny Devries M.D., Medical Director    PATIENT NAME                                     LABORATORY NO.  JESSIE BELL                                   [id number]  [id number]                                 AGE                    SEX   SSN              CLIENT REF #  [facility]                    29        1995      F     [id number]      [id number]    Yong LOPEZING M.D.            ATTENDING [place] [address]                       JUAN F NAVARRO  DATE COLLECTED            DATE RECEIVED          DATE REPORTED  06/16/2025 06/16/2025 06/18/2025    ThinPrep Pap with [facility]    DIAGNOSIS:  Negative for intraepithelial lesion or malignancy    Multiple factors can influence accuracy of Pap tests; therefore, screening  at  regular intervals is necessary for early cancer detection.    Professional interpretation rendered by Boaz Naylor M.D. at [email or web address]  [email or web address], [address] [place] [address].  SPECIMEN ADEQUACY:  SATISFACTORY FOR EVALUATION  Transformation zone is present.  SOURCE OF SPECIMEN:       CERVICAL  SLIDES:  1  CLINICAL HISTORY:  Women's annual routine gynecological examination      CYTOTECHNOLOGIST:             LAVONNE CT (ASCP)                                      REVIEWED, DIAGNOSED AND  ELECTRONICALLY SIGNED BY:      Boaz Naylor M.D.    CPT CODES:  [id number]           Assessment / Plan      Assessment/Plan:   Diagnoses and all orders for this visit:    1. Viral upper respiratory tract infection (Primary)  Assessment & Plan:  Approximate 5-day history of a scratchy sore throat, intermittently having a sense of something caught in her throat, nasal congestion, postnasal drip, no headache, no fevers or chills, no cough or any respiratory distress, able to swallow without any difficulty.  Was seen in the ER yesterday with unremarkable evaluation, rapid strep negative, tentative plans to have referred her to ENT for consideration of laryngoscopy.  She really has no specific complaints at this time other than irritation of her throat.  She had normal transillumination of her sinuses to light, no fevers or chills, no palpable sinus tenderness, unlikely to represent an acute sinusitis at this time.  Also very unlikely that she has any sort of foreign body in her throat, symptoms likely related to irritation  from postnasal drip.  Recommend continue Claritin, adding Flonase, brief course of prednisone, and if does develop cough Robitussin DM.  If she is not improving her symptoms in the next 2 to 3 days or for any significant worsening in the interim, advise accordingly    Orders:  -     predniSONE (DELTASONE) 20 MG tablet; Take 1 tablet by mouth Daily for 3 days.  Dispense: 3 tablet; Refill: 0  -     fluticasone (FLONASE) 50 MCG/ACT nasal spray; Administer 2 sprays into the nostril(s) as directed by provider Daily.  Dispense: 16 g; Refill: 5        Vaccine Counseling:      Follow Up:   Return in about 2 weeks (around 8/11/2025) for Next scheduled follow up.      At [facility], we believe that sharing information builds trust and better relationships. You are receiving this note because you recently visited [facility]. It is possible you will see health information before a provider has talked with you about it. This kind of information can be easy to misunderstand. To help you fully understand what it means for your health, we urge you to discuss this note with your provider.    Danny Boss MD  [facility]

## 2025-08-15 ENCOUNTER — OFFICE VISIT (OUTPATIENT)
Dept: FAMILY MEDICINE CLINIC | Facility: CLINIC | Age: 30
End: 2025-08-15
Payer: MEDICARE

## 2025-08-15 VITALS
BODY MASS INDEX: 43.03 KG/M2 | OXYGEN SATURATION: 97 % | HEART RATE: 74 BPM | SYSTOLIC BLOOD PRESSURE: 126 MMHG | WEIGHT: 274.2 LBS | TEMPERATURE: 97.9 F | DIASTOLIC BLOOD PRESSURE: 83 MMHG | HEIGHT: 67 IN

## 2025-08-15 DIAGNOSIS — I10 PRIMARY HYPERTENSION: ICD-10-CM

## 2025-08-15 DIAGNOSIS — R73.03 PREDIABETES: ICD-10-CM

## 2025-08-15 DIAGNOSIS — E66.813 CLASS 3 SEVERE OBESITY DUE TO EXCESS CALORIES WITHOUT SERIOUS COMORBIDITY WITH BODY MASS INDEX (BMI) OF 45.0 TO 49.9 IN ADULT: Primary | ICD-10-CM

## 2025-08-15 RX ORDER — PHENTERMINE HYDROCHLORIDE 37.5 MG/1
37.5 CAPSULE ORAL EVERY MORNING
Qty: 30 CAPSULE | Refills: 0 | Status: SHIPPED | OUTPATIENT
Start: 2025-08-15

## 2025-08-27 DIAGNOSIS — E66.813 CLASS 3 SEVERE OBESITY DUE TO EXCESS CALORIES WITHOUT SERIOUS COMORBIDITY WITH BODY MASS INDEX (BMI) OF 45.0 TO 49.9 IN ADULT: Primary | ICD-10-CM

## 2025-08-27 RX ORDER — PHENTERMINE HYDROCHLORIDE 37.5 MG/1
37.5 TABLET ORAL
Qty: 30 TABLET | Refills: 0 | Status: SHIPPED | OUTPATIENT
Start: 2025-08-27